# Patient Record
Sex: FEMALE
[De-identification: names, ages, dates, MRNs, and addresses within clinical notes are randomized per-mention and may not be internally consistent; named-entity substitution may affect disease eponyms.]

---

## 2017-04-22 ENCOUNTER — HOSPITAL ENCOUNTER (INPATIENT)
Dept: HOSPITAL 42 - ED | Age: 67
LOS: 3 days | Discharge: HOME | DRG: 74 | End: 2017-04-25
Attending: STUDENT IN AN ORGANIZED HEALTH CARE EDUCATION/TRAINING PROGRAM | Admitting: INTERNAL MEDICINE
Payer: MEDICARE

## 2017-04-22 VITALS — BODY MASS INDEX: 23.6 KG/M2

## 2017-04-22 DIAGNOSIS — E87.6: ICD-10-CM

## 2017-04-22 DIAGNOSIS — Z66: ICD-10-CM

## 2017-04-22 DIAGNOSIS — K21.9: ICD-10-CM

## 2017-04-22 DIAGNOSIS — I10: ICD-10-CM

## 2017-04-22 DIAGNOSIS — E78.5: ICD-10-CM

## 2017-04-22 DIAGNOSIS — E11.65: ICD-10-CM

## 2017-04-22 DIAGNOSIS — Z80.1: ICD-10-CM

## 2017-04-22 DIAGNOSIS — F17.210: ICD-10-CM

## 2017-04-22 DIAGNOSIS — Z95.5: ICD-10-CM

## 2017-04-22 DIAGNOSIS — Z79.4: ICD-10-CM

## 2017-04-22 DIAGNOSIS — F41.9: ICD-10-CM

## 2017-04-22 DIAGNOSIS — M21.379: ICD-10-CM

## 2017-04-22 DIAGNOSIS — E22.2: ICD-10-CM

## 2017-04-22 DIAGNOSIS — I25.10: ICD-10-CM

## 2017-04-22 DIAGNOSIS — E11.40: Primary | ICD-10-CM

## 2017-04-22 DIAGNOSIS — R20.0: ICD-10-CM

## 2017-04-22 DIAGNOSIS — J44.9: ICD-10-CM

## 2017-04-22 LAB
ADD MANUAL DIFF?: NO
ALBUMIN/GLOB SERPL: 1.2 {RATIO} (ref 1.1–1.8)
ALP SERPL-CCNC: 114 U/L (ref 38–133)
ALT SERPL-CCNC: 28 U/L (ref 7–56)
APPEARANCE UR: CLEAR
APTT BLD: 23.4 SECONDS (ref 23.7–30.8)
AST SERPL-CCNC: 30 U/L (ref 15–39)
BASE EXCESS BLDV CALC-SCNC: -0.1 MMOL/L (ref 0–2)
BASE EXCESS BLDV CALC-SCNC: 1.8 MMOL/L (ref 0–2)
BASOPHILS # BLD AUTO: 0.03 K/MM3 (ref 0–2)
BASOPHILS NFR BLD: 0.2 % (ref 0–3)
BILIRUB SERPL-MCNC: 0.8 MG/DL (ref 0.2–1.3)
BILIRUB UR-MCNC: NEGATIVE MG/DL
BUN SERPL-MCNC: 17 MG/DL (ref 7–21)
CALCIUM SERPL-MCNC: 9.2 MG/DL (ref 8.4–10.5)
CHLORIDE SERPL-SCNC: 95 MMOL/L (ref 98–107)
CO2 SERPL-SCNC: 26 MMOL/L (ref 21–33)
COLOR UR: YELLOW
EOSINOPHIL # BLD: 0.2 10*3/UL (ref 0–0.7)
EOSINOPHIL NFR BLD: 1.2 % (ref 1.5–5)
ERYTHROCYTE [DISTWIDTH] IN BLOOD BY AUTOMATED COUNT: 13.9 % (ref 11.5–14.5)
GLOBULIN SER-MCNC: 3 GM/DL
GLUCOSE SERPL-MCNC: 142 MG/DL (ref 70–110)
GLUCOSE UR STRIP-MCNC: NEGATIVE MG/DL
GRANULOCYTES # BLD: 10.91 10*3/UL (ref 1.4–6.5)
GRANULOCYTES NFR BLD: 64.1 % (ref 50–68)
HCT VFR BLD CALC: 37.5 % (ref 36–48)
INR PPP: 0.96 (ref 0.93–1.08)
KETONES UR STRIP-MCNC: NEGATIVE MG/DL
LEUKOCYTE ESTERASE UR-ACNC: NEGATIVE LEU/UL
LYMPHOCYTES # BLD: 4.1 10*3/UL (ref 1.2–3.4)
LYMPHOCYTES NFR BLD AUTO: 23.8 % (ref 22–35)
MCH RBC QN AUTO: 30.8 PG (ref 25–35)
MCHC RBC AUTO-ENTMCNC: 35.7 G/DL (ref 31–37)
MCV RBC AUTO: 86.2 FL (ref 80–105)
MONOCYTES # BLD AUTO: 1.8 10*3/UL (ref 0.1–0.6)
MONOCYTES NFR BLD: 10.7 % (ref 1–6)
PH BLDV: 7.43 [PH] (ref 7.32–7.43)
PH BLDV: 7.45 [PH] (ref 7.32–7.43)
PH UR STRIP: 7.5 [PH] (ref 4.7–8)
PLATELET # BLD: 460 10^3/UL (ref 120–450)
PMV BLD AUTO: 9.4 FL (ref 7–11)
POTASSIUM SERPL-SCNC: 3.2 MMOL/L (ref 3.6–5)
PROT SERPL-MCNC: 6.5 G/DL (ref 5.8–8.3)
PROT UR STRIP-MCNC: NEGATIVE MG/DL
RBC # UR STRIP: NEGATIVE /UL
SODIUM SERPL-SCNC: 130 MMOL/L (ref 132–148)
SP GR UR STRIP: 1.01 (ref 1–1.03)
TROPONIN I SERPL-MCNC: < 0.01 NG/ML
UROBILINOGEN UR STRIP-ACNC: 0.2 E.U./DL
WBC # BLD AUTO: 17 10^3/UL (ref 4.5–11)

## 2017-04-22 PROCEDURE — 3E0F7GC INTRODUCTION OF OTHER THERAPEUTIC SUBSTANCE INTO RESPIRATORY TRACT, VIA NATURAL OR ARTIFICIAL OPENING: ICD-10-PCS | Performed by: STUDENT IN AN ORGANIZED HEALTH CARE EDUCATION/TRAINING PROGRAM

## 2017-04-22 RX ADMIN — INSULIN HUMAN SCH UNITS: 100 INJECTION, SOLUTION PARENTERAL at 22:33

## 2017-04-22 RX ADMIN — INSULIN DETEMIR SCH UNIT: 100 INJECTION, SOLUTION SUBCUTANEOUS at 22:33

## 2017-04-22 RX ADMIN — ALBUTEROL SULFATE PRN MG: 2.5 SOLUTION RESPIRATORY (INHALATION) at 19:53

## 2017-04-22 RX ADMIN — ARFORMOTEROL TARTRATE SCH MCG: 15 SOLUTION RESPIRATORY (INHALATION) at 19:53

## 2017-04-22 RX ADMIN — BUDESONIDE SCH MG: 0.5 SUSPENSION RESPIRATORY (INHALATION) at 19:58

## 2017-04-22 NOTE — CARD
--------------- APPROVED REPORT --------------





EKG Measurement

Heart [id number]

ID [id number]

[id number]

[id number]

[id number]



<Conclusion>

Normal sinus rhythm

Left bundle branch block

Abnormal ECG

## 2017-04-22 NOTE — CP.PCM.HP
<EdenilsonRonnie - Last Filed: 17 15:45>





History of Present Illness





- History of Present Illness


History of Present Illness: 


cc: left foot numbness





HPI: Patient is a 67yo female with past medical history of DM type 2, CAD s/p 

stent placement, COPD, anxiety, tobacco use, HTN and HLD that presents c/o left 

foot numbness for the past 4 days. Patient states that 4 days prior, she began 

experiencing some left foot numbness associated with parasthesias and pain. She 

reported that the numbness was constant with no apparent mitigating or 

worsening factors. Patient stated that the numbness localized only to her foot 

and did not effect her thigh or leg and she became concerned when her pain 

began to worsen and she was unable to walk properly. She reported decreased 

sensation in the left foot and some lightheadedness today. She denied chest pain

, palpitations, SOB, headache, fever, chills, abdominal pain, nausea, vomiting. 





12point ROS as per HPI above, otherwise negative


PMHx: DM type 2, CAD s/p stent placement, COPD, anxiety, tobacco use, HTN, HLD


PSHx: trigger finger release right hand, left ulnar nerve decompression, b/l 

oophorectomy


Family Hx: Mother: Lung Ca, Father: Unknown to her, Sister: Uterine Ca


Social Hx: Current tobacco use (1/2ppd for over 50yrs), denies illicit drugs 

and alcohol use





Present on Admission





- Present on Admission


Any Indicators Present on Admission: No





Past Patient History





- Infectious Disease


Hx of Infectious Diseases: None





- Tetanus Immunizations


Tetanus Immunization: Unknown





- Past Social History


Smoking Status: Current Some Days Smoker





- CARDIAC


Hx Cardiac Disorders: Yes (CAD,PAD, r coronary artery,ANGIOPLASTY)


Hx Hypertension: Yes





- PULMONARY


Hx Respiratory Disorders: Yes


Hx Chronic Obstructive Pulmonary Disease (COPD): Yes





- NEUROLOGICAL


Hx Neurological Disorder: Yes


Hx Dizziness: Yes





- HEENT


Hx HEENT Problems: No





- RENAL


Hx Chronic Kidney Disease: No





- ENDOCRINE/METABOLIC


Hx Endocrine Disorders: Yes


Hx Diabetes Mellitus Type 2: Yes





- HEMATOLOGICAL/ONCOLOGICAL


Hx Blood Disorders: No





- INTEGUMENTARY


Hx Dermatological Problems: Yes


Hx Psoriasis: Yes





- MUSCULOSKELETAL/RHEUMATOLOGICAL


Hx Musculoskeletal Disorders: Yes (LUMBAR RADICULOPATHY,L ULNAR NERVE 

ENTRAPMENT ,DECOMPRESSION)


Hx Back Pain: Yes





- GASTROINTESTINAL


Hx Gastrointestinal Disorders: Yes


Hx Gastroesophageal Reflux: Yes





- GENITOURINARY/GYNECOLOGICAL


Hx Genitourinary Disorders: Yes (1  1 MISCARRIAGE)


Other/Comment: OOPHORECTOMY





- PSYCHIATRIC


Hx Psychophysiologic Disorder: Yes


Hx Anxiety: Yes


Hx Depression: Yes


Hx Emotional Abuse: No


Hx Physical Abuse: No


Hx Substance Use: No





- SURGICAL HISTORY


Hx Cardiac Catheterization: Yes ()


Hx Coronary Stent: Yes (X2)


Other/Comment: trigger finger and ulna sx





- ANESTHESIA


Hx Anesthesia: Yes


Hx Anesthesia Reactions: No


Hx Malignant Hyperthermia: No





Meds


Allergies/Adverse Reactions: 


 Allergies











Allergy/AdvReac Type Severity Reaction Status Date / Time


 


bacitracin Allergy  SWELLING Verified 10/15/16 22:21


 


latex Allergy  RASH Verified 17 11:24


 


Penicillins Allergy  ANAPHYLAXIS Verified 17 11:24














Physical Exam





- Constitutional


Appears: Well, Non-toxic, No Acute Distress





- Head Exam


Head Exam: ATRAUMATIC, NORMAL INSPECTION, NORMOCEPHALIC





- Eye Exam


Eye Exam: EOMI, PERRL.  absent: Scleral icterus





- ENT Exam


ENT Exam: Mucous Membranes Moist





- Neck Exam


Neck exam: Positive for: Normal Inspection.  Negative for: Lymphadenopathy, 

Tenderness, Thyromegaly





- Respiratory Exam


Respiratory Exam: Clear to Auscultation Bilateral.  absent: Rales, Rhonchi, 

Wheezes





- Cardiovascular Exam


Cardiovascular Exam: RRR, +S1, +S2.  absent: Clicks, JVD, Rubs





- GI/Abdominal Exam


GI & Abdominal Exam: Normal Bowel Sounds, Soft.  absent: Distended, Firm, 

Guarding, Rebound, Tenderness





- Extremities Exam


Extremities exam: Positive for: normal inspection, tenderness, pedal pulses 

present.  Negative for: pedal edema


Additional comments: 


left foot tenderness and decreased sensation


5/5 muscle strength in bilateral upper and lower extremities


2/4 dorsalis pedis and posterior tibialis pulses in bilateral lower extremities


right foot sensation intact








- Neurological Exam


Neurological exam: Alert, CN II-XII Intact, Oriented x3


Additional comments: 


alert, awake, oriented x3


cranial nerve 2-12 intact


no facial droop or facial asymmetry


no slurred speech





- Psychiatric Exam


Psychiatric exam: Normal Affect, Normal Mood





- Skin


Skin Exam: Dry, Intact, Normal Color, Warm





Results





- Vital Signs


Recent Vital Signs: 





 Last Vital Signs











Temp  98.2 F   17 11:16


 


Pulse  89   17 13:13


 


Resp  16   17 13:13


 


BP  143/71   17 13:13


 


Pulse Ox  100   17 13:13














- Labs


Result Diagrams: 


 17 12:10





 17 12:10


Labs: 





 Laboratory Results - last 24 hr











  17





  12:10


 


WBC  17.0 H D


 


RBC  4.35


 


Hgb  13.4


 


Hct  37.5


 


MCV  86.2


 


MCH  30.8


 


MCHC  35.7


 


RDW  13.9


 


Plt Count  460 H


 


MPV  9.4


 


Gran %  64.1


 


Lymph % (Auto)  23.8


 


Mono % (Auto)  10.7 H


 


Eos % (Auto)  1.2 L


 


Baso % (Auto)  0.2


 


Gran #  10.91 H


 


Lymph #  4.1 H


 


Mono #  1.8 H


 


Eos #  0.2


 


Baso #  0.03


 


PT  10.4


 


INR  0.96


 


APTT  23.4 L


 


pO2  28 L


 


VBG pH  7.45 H


 


VBG pCO2  37.0 L


 


VBG HCO3  25.7


 


VBG Total CO2  26.8


 


VBG O2 Sat (Calc)  61.2


 


VBG Base Excess  1.8


 


VBG Potassium  3.9


 


Sodium  130 L


 


Chloride  95 L


 


Glucose  152 H


 


Lactate  3.7 H


 


FiO2  21.0


 


Potassium  3.2 L


 


Carbon Dioxide  26


 


Anion Gap  12


 


BUN  17


 


Creatinine  0.6


 


Est GFR ( Amer)  > 60


 


Est GFR (Non-Af Amer)  > 60


 


Random Glucose  142 H


 


Calcium  9.2


 


Total Bilirubin  0.8


 


AST  30


 


ALT  28


 


Alkaline Phosphatase  114


 


Lactate Dehydrogenase  503


 


Total Creatine Kinase  50


 


Troponin I  < 0.01  D


 


Total Protein  6.5


 


Albumin  3.5


 


Globulin  3.0


 


Albumin/Globulin Ratio  1.2


 


Venous Blood Potassium  3.9


 


Urine Color  Yellow


 


Urine Appearance  Clear


 


Urine pH  7.5


 


Ur Specific Gravity  1.010


 


Urine Protein  Negative


 


Urine Glucose (UA)  Negative


 


Urine Ketones  Negative


 


Urine Blood  Negative


 


Urine Nitrate  Negative


 


Urine Bilirubin  Negative


 


Urine Urobilinogen  0.2


 


Ur Leukocyte Esterase  Negative














Assessment & Plan





- Assessment and Plan (Free Text)


Assessment: 


67yo female with history of CAD, COPD, HTN, HLD, tobacco use, anxiety presents c

/o left foot numbness for past 4 days associated with parasthesias, decreased 

sensation and lightheadedness








Plan: 


1. Left foot numbness


-Head CT reviewed; no acute intracranial pathology


-EKG reviewed; normal sinus rhythm with left bundle branch block, no acute ST-

Twave changes; unchanged from prior EKG


-LE doppler pending


-Neurochecks q4h


-Vit B6, B12 pending


-Carotid doppler pending


-Continue ASA, plavix, lipitor, lisinopril


-Neurology consulted - Dr. Cortez





2. COPD


-Continue home meds


-CXR reviewed; no apparent active disease





3. Hypertension


-Continue home meds





4. Hyperlipidemia


-Continue home meds





5. DM type 2


-Levemir 20u HS


-Humalog low dose sliding scale


-consistent carb diet


-Fingersticks ACHS





6. CAD


-Continue ASA, plavix, lipitor, lisinopril





7. DVT/GI Prophylaxis


-Lovenox/Protonix





Patient seen and case discussed with attending, Dr. Gilliam








- Date & Time


Date: 17


Time: 15:37





<Myrna Gilliam MD - Last Filed: 17 16:59>





Results





- Vital Signs


Recent Vital Signs: 





 Last Vital Signs











Temp  98.2 F   17 11:16


 


Pulse  82   17 16:30


 


Resp  16   17 16:30


 


BP  142/81   17 16:30


 


Pulse Ox  98   17 16:30














- Labs


Result Diagrams: 


 17 12:10





 17 12:10





Attending/Attestation





- Attestation


I have personally seen and examined this patient.: Yes


I have fully participated in the care of the patient.: Yes


I have reviewed all pertinent clinical information: Yes


Notes (Text): 





Patient was seen and examined with medical resident .Agreed with resident 

assessment and plan.





 66 yrs  female with past medical history of IRDM, CAD s/p  RCA stent placement 

, COPD, anxiety, tobacco use, HTN and HLD  is admitted with left foot 

numbness and weakness, has sensory deficit in left foot and left lower leg, CT 

head is negative.The etiology of patient symptoms unclear, could be CVA/ L4-5 

rediculopathy, will monitor Neuro check, will check vitamin B 6 and B 12 level, 

we will also get carotid Doppler and will Neurology consult.





Leukocytosis is likely due to recent use of steroid, Chest X ray is negative 

for Pneumonia, UA is negative.Patient does not look to be septic, we will 

monitor WBC count.





Lactic acid on Venous blood gas  is high, etiology unclear, we will repeat 

lactic acid level, if still high, will treat for possible sepsis, we will also 

check lactic acid level.





Management plan was discussed in detail with patient


 Education was provided.

## 2017-04-22 NOTE — ED PDOC
Arrival/HPI





- General


Chief Complaint: Dizziness/Lightheaded


Time Seen by Provider: 17 11:10


Historian: Patient





- History of Present Illness


Narrative History of Present Illness (Text): 


17 11:25





Maryjane Marte is a 66 year old female, whose past medical history includes COPD

, CAD, and cardiac stents, who presents to the emergency department complaining 

of "feeling shakey," associated with "neck discomfort" and shortness of breath 

that has been present for past two weeks. Patient notes that her symptoms began 

when she had a cold with associated coughs, rhinorrhea, congestion, and mild 

fever 2 weeks ago. Patient states her PMD placed her on antibiotics, Levaquin 

for cough with no improvement.  Subsequently was prescribed a cough medication 

which she feels "made my blood sugar really high". Patient states she has 

severe constant pain to left ankle/foot that has worsened over the past several 

days.  Denies trauma. Denies redness or swelling or open lesions. She reports 

that her "left foot drags" "since I was in the hospital last".  She also states 

"I drop things with my hands once in a while".





PMD: Dr. JOVANA Philip











Time/Duration: < month (2 weeks)


Symptom Onset: Gradual


Symptom Course: Worsening


Severity Level: Mild


Activities at Onset: Rest


Context: Home





Past Medical History





- Provider Review


Nursing Documentation Reviewed: Yes





- Infectious Disease


Hx of Infectious Diseases: None





- Tetanus Immunization


Tetanus Immunization: Unknown





- Cardiac


Hx Cardiac Disorders: Yes (CAD,PAD, r coronary artery,ANGIOPLASTY)


Hx Hypertension: Yes





- Pulmonary


Hx Respiratory Disorders: Yes


Hx Chronic Obstructive Pulmonary Disease (COPD): Yes





- Neurological


Hx Neurological Disorder: Yes


Hx Dizziness: Yes





- HEENT


Hx HEENT Disorder: No





- Renal


Hx Renal Disorder: No





- Endocrine/Metabolic


Hx Endocrine Disorders: Yes


Hx Diabetes Mellitus Type 2: Yes





- Hematological/Oncological


Hx Blood Disorders: No





- Integumentary


Hx Dermatological Disorder: Yes


Hx Psoriasis: Yes





- Musculoskeletal/Rheumatological


Hx Musculoskeletal Disorders: Yes (LUMBAR RADICULOPATHY,L ULNAR NERVE 

ENTRAPMENT ,DECOMPRESSION)


Hx Back Pain: Yes





- Gastrointestinal


Hx Gastrointestinal Disorders: Yes


Hx Gastroesophageal Reflux: Yes





- Genitourinary/Gynecological


Hx Genitourinary Disorders: Yes (1  1 MISCARRIAGE)


Other/Comment: OOPHORECTOMY





- Psychiatric


Hx Psychophysiologic Disorder: Yes


Hx Anxiety: Yes


Hx Depression: Yes


Hx Emotional Abuse: No


Hx Physical Abuse: No


Hx Substance Use: No





- Surgical History


Hx Cardiac Catheterization: Yes (2008)


Hx Coronary Stent: Yes (X2)


Other/Comment: trigger finger and ulna sx





- Anesthesia


Hx Anesthesia: Yes


Hx Anesthesia Reactions: No


Hx Malignant Hyperthermia: No





- Suicidal Assessment


Feels Threatened In Home Enviroment: No





Family/Social History





- Physician Review


Nursing Documentation Reviewed: Yes


Family/Social History: No Known Family HX


Smoking Status: Current Some Days Smoker


Hx Alcohol Use: No


Hx Substance Use: No


Hx Substance Use Treatment: No





Allergies/Home Meds


Allergies/Adverse Reactions: 


Allergies





bacitracin Allergy (Verified 10/15/16 22:21)


 SWELLING


latex Allergy (Verified 17 11:24)


 RASH


Penicillins Allergy (Verified 17 11:24)


 ANAPHYLAXIS








Home Medications: 


 Home Meds











 Medication  Instructions  Recorded  Confirmed


 


Atorvastatin Calcium [Lipitor] 20 mg PO DAILY 13


 


QUEtiapine [Seroquel] 100 mg PO DAILY 14


 


Alprazolam [Xanax] 1 mg PO QID 07/25/15 04/22/17


 


Aspirin [Ecotrin] 325 mg PO DAILY 09/26/15 04/22/17


 


Esomeprazole Magnesium [Nexium] 40 mg PO DAILY 16


 


Metoprolol Succinate [Toprol XL] 200 mg PO DAILY 10/06/16 04/22/17


 


Tiotropium [Spiriva] 18 mcg IH DAILY 10/06/16 04/22/17


 


Albuterol Sulfate [Proair Hfa] 1 inh INH PRN PRN 17


 


Fluticasone/Salmeterol 250/50 1 inh INH BID 17





[Advair Diskus 250/50]   


 


Insulin Lispro [humALOG] 0 units XX PRN PRN 17


 


Methylprednisolone 0 mg PO .AS PRESCRIBED 17





[Methylprednisolone]   


 


Mometasone 0.1% [Elocon Cream] 1 appful TOP PRN PRN 17


 


Valsartan [Diovan] 25 mg PO DAILY 17


 


Varenicline Tartrate [Chantix] 0 mg PO BID 17














Review of Systems





- Review of Systems


Constitutional: Fatigue.  absent: Fevers, Night Sweats


Eyes: absent: Vision Changes, Eye Pain


ENT: Rhinorrhea, Sinus Congestion.  absent: Hearing Changes


Respiratory: SOB, Cough


Cardiovascular: Chest Pain, [place].  absent: Edema


Gastrointestinal: Abdominal Pain (cramping)


Genitourinary Female: absent: Urine Output Changes


Musculoskeletal: absent: Back Pain, Neck Pain


Skin: absent: Rash, Pruritis


Neurological: absent: Headache, Dizziness


Endocrine: absent: Diaphoresis


Hemo/Lymphatic: absent: Easy Bleeding


Psychiatric: Anxiety.  absent: Depression





Physical Exam





- Physical Exam


Narrative Physical Exam (Text): 


Head:  Atraumatic.  Normocephalic. 


Eyes:  PERRL.  EOMI.  Conjunctivae are not pale.


ENT:  Mucous membranes are moist and intact.  Oropharynx is clear and 

symmetric. No facial edema or erythema.


Neck:  Supple.  Full ROM.  No JVD.  No lymphadenopathy.


Cardiovascular: Tachycardia.  No murmurs, rubs, or gallops. 


Pulmonary/Chest:  Tachypneic. No evidence of respiratory distress.  Clear to 

auscultation bilaterally.  No wheezing, rales or rhonchi.


Abdominal:  Soft and non-distended.  There is no tenderness.  No rebound, 

guarding, or rigidity.  No organomegaly.  Good bowel sounds. No pulsatile 

masses.


Back:  No CVA tenderness.


Extremities:  Strong palpated DP pulse in Lower Left Extremity. No edema.   No 

cyanosis.  No clubbing.  Full range of motion in all extremities.  No calf 

tenderness. No knee pain. Tender to palpation dorsum of left foot and left heel/

achilles. No ulcers or erythema noted. Strong femoral pulses.


Skin:  Skin is warm and dry.  No petechiae.  No purpura. No cyanosis noted to 

feet.


Neurological:  Alert, awake, and oriented to person, place, time, and 

situation.  Mild foot drop noted to left lower extremity, has weak dorsiflexion 

at ankle. No weakness noted at knee or hip or upper extremities.


Psychiatric:  Good eye contact.  Normal interaction, affect, and behavior.











17 20:42





Vital Signs Reviewed: Yes


Vital Signs











  Temp Pulse Resp BP Pulse Ox


 


 17 16:30   82  16  142/81  98


 


 17 13:13   89  16  143/71  100


 


 17 11:16  98.2 F  108 H  20  155/74 H  100











Temperature: Afebrile


Blood Pressure: Hypertensive


Pulse: Tachycardic


Respiratory Rate: Normal


Appearance: Positive for: Uncomfortable, Other (Anxious)


Pain Distress: Moderate


Mental Status: Positive for: Alert and Oriented X 3





Medical Decision Making


ED Course and Treatment: 


17 11:25





Impression:





66 year old female  complaining of "feeling shakey," shortness of breath, and 

cramps today and left foot pain for four days.





Differential Diagnosis included but are not limited to:  DVT vs. Peripheral 

Vascular Disease vs. Anxiety vs. COPD vs. Pneumonia





Plan:


-- EKG


-- Chest X-ray


-- Bilateral Lower Extremity US


-- Carotid and Vertebral US


-- VBG, Blood Culture


-- Urine Culture


-- Labs


-- K-Dur, Morphine, and Xanax 


-- Reassess and disposition





Prior Visits:


Notes and results from previous visits were reviewed. Patient last seen in the 

ED on 02/10/17 for intermittent chest pressure and shortness of breath for one 

week. 





Progress Notes:


17 20:45


Patient is a 65 yo female who has prior cardiac history, recent stents, 

CONTINUE TO SMOKE, risks of this discussed. She has had persistent cough, sob.


On exam, no respiratory distress noted although leukocytosis and elevated 

lactate noted. On re-exam, not tachycardic or tachypneic, with no clear source 

of infection. Not hypotensive. Exam not consistent with sepsis.


IV fluids given and repeat lactate ordered.


She has significant pain to left foot with no known trauma. THERE IS A STRONG 

AND PALPABLE PULSE in the left lower extremity, this exam is thus NOT 

consistent with a severe acute arterial occlusion. Cannot exclude a component 

of neuropathy or PVD, although ultrasound is NEGATIVE FOR DVT as per 

technician.  Patient is noted to have some improvement, but persistent pain 

after iv morphine. Cannot exclude medication effect or tendinitis as source of 

pain as well.





Neck discomfort she reports is "like when I had my heart attack", although 

current EKG is unchanged from previous and initial troponin unremarkable. She 

denies chest discomfort currently.


Case discussed with PMD, requests admission to hospitalist, case d/w Dr. Gilliam.





Foot drop by history appears to have been present for several weeks. On exam, 

there is no midline back pain or other neuro deficits noted. Deficits appears 

isolated to left foot/ankle. CT head unremarkable. Will continue serial 

neurologic exams.








- Lab Interpretations


Lab Results: 








 17 12:10 





 17 12:10 





 Lab Results





17 12:10: WBC 17.0 H D, RBC 4.35, Hgb 13.4, Hct 37.5, MCV 86.2, MCH 30.8, 

MCHC 35.7, RDW 13.9, Plt Count 460 H, MPV 9.4, Gran % 64.1, Lymph % (Auto) 23.8

, Mono % (Auto) 10.7 H, Eos % (Auto) 1.2 L, Baso % (Auto) 0.2, Gran # 10.91 H, 

Lymph # 4.1 H, Mono # 1.8 H, Eos # 0.2, Baso # 0.03, PT 10.4, INR 0.96, APTT 

23.4 L, pO2 28 L, VBG pH 7.45 H, VBG pCO2 37.0 L, VBG HCO3 25.7, VBG Total CO2 

26.8, VBG O2 Sat (Calc) 61.2, VBG Base Excess 1.8, VBG Potassium 3.9, Glucose 

152 H, Lactate 3.7 H, FiO2 21.0, Sodium 131.0 L, Potassium 3.2 L, Chloride 96.0 

L, Carbon Dioxide 26, Anion Gap 12, BUN 17, Creatinine 0.6, Est GFR (African 

Amer) > 60, Est GFR (Non-Af Amer) > 60, Random Glucose 142 H, Calcium 9.2, 

Total Bilirubin 0.8, AST 30, ALT 28, Alkaline Phosphatase 114, Lactate 

Dehydrogenase 503, Total Creatine Kinase 50, Troponin I < 0.01  D, Total 

Protein 6.5, Albumin 3.5, Globulin 3.0, Albumin/Globulin Ratio 1.2, Venous 

Blood Potassium 3.9, Urine Color Yellow, Urine Appearance Clear, Urine pH 7.5, 

Ur Specific Gravity 1.010, Urine Protein Negative, Urine Glucose (UA) Negative, 

Urine Ketones Negative, Urine Blood Negative, Urine Nitrate Negative, Urine 

Bilirubin Negative, Urine Urobilinogen 0.2, Ur Leukocyte Esterase Negative











- RAD Interpretation


Radiology Orders: 








17 11:39


CHEST ONE VIEW [RAD] Stat 


DUPLEX LOWER EXTRM VEIN BILAT [US] Stat 





17 13:32


HEAD W/O CONTRAST [CT] Stat 











: ED Physician





- EKG Interpretation


EKG Interpretation (Text): 


EKG 13:06 normal sinus rhythm rate of 86 with left bundle branch block





Interpreted by ED Physician: Yes


Type: 12 lead EKG


Comparison: Com.w/previous EKG





- Medication Orders


Current Medication Orders: 








Albuterol Sulfate (Albuterol 0.5% Inhal Sol (2.5 Mg/0.5 Ml) Ud)  2.5 mg IH 

[id number] PRN


   PRN Reason: Shortness of Breath


   Last Admin: 17 19:53  Dose: 2.5 MG





Alprazolam (Xanax)  1 mg PO QID [facility]


   PRN Reason: Protocol


   Stop: 17 18:01


   Last Admin: 17 18:22  Dose: 1 MG





Behavioural


 Document     17 18:22  MANIL  (Rec: 17 18:22  MANIL  [id number])


     Maintenance


      Maintenance Dose                           Yes


     Nonmedicinal


      Nonmedicinal Interventions                 Redirect


     Behavior


      Behavior for Medication:                   Anxiety


Re-Assess: Reassess Psych Meds


 Document     17 19:22  RR  (Rec: 17 20:07  RR  [id number])


      Reassess Psych Med                         Effective





Arformoterol Tartrate (Brovana)  15 mcg IH [id number] [facility]


   Last Admin: 17 19:53  Dose: 15 MCG





Aspirin (Ecotrin)  325 mg PO DAILY [facility]


Atorvastatin Calcium (Lipitor)  20 mg PO DAILY [facility]


Budesonide (Pulmicort Respules)  0.5 mg IH [id number] [facility]


   Last Admin: 17 19:58  Dose: 0.5 MG





Clopidogrel Bisulfate (Plavix)  75 mg PO DAILY [facility]


Enoxaparin Sodium (Lovenox)  40 mg SC DAILY [facility]


   PRN Reason: Protocol


Guaifenesin (Robitussin)  100 mg PO Q4H PRN


   PRN Reason: Cough


Insulin Detemir (Levemir)  20 unit SC HS [facility]


Insulin Human Regular (Humulin R Med)  0 units SC ACHS [facility]


   PRN Reason: Protocol


Losartan Potassium (Cozaar)  25 mg PO DAILY [facility]


Nicotine (Nicoderm Cq)  1 patch TD DAILY [facility]


   Last Admin: 17 18:22  Dose: 1 PATCH





MAR Patch Placement/Removal


 Document     17 18:22  MANIL  (Rec: 17 18:22  MANIL  [id number])


     Patch Removal


      Removal of previous patch done             No


     Patch Placement


      Left, Right or Bilateral                   Right


      Upper or Lower                             Lower


      Pain Location Body Site                    Arm





Pantoprazole Sodium (Protonix Inj)  40 mg IVP DAILY [facility]


Quetiapine Fumarate (Seroquel)  100 mg PO DAILY [facility]


   PRN Reason: Protocol





Discontinued Medications





Albuterol Sulfate (Albuterol 0.5% Inhal Sol (2.5 Mg/0.5 Ml) Ud)  2.5 mg IH 

[id number] PRN


   PRN Reason: Shortness of Breath


Alprazolam (Xanax)  1 mg PO STAT [facility]


   Stop: 17 13:37


   Last Admin: 17 13:45  Dose: 1 MG





Behavioural


 Document     17 13:45  HI  (Rec: 17 13:45  HI  [id number])


     Maintenance


      Maintenance Dose                           No


     Nonmedicinal


      Nonmedicinal Interventions                 Redirect


     Behavior


      Behavior for Medication:                   Anxiety





Sodium Chloride (Sodium Chloride 0.9%)  500 mls @ 1,000 mls/hr IV .Q30M STA


   Stop: 17 12:09


   Last Admin: 17 12:12  Dose: 1,000 MLS/HR





eMAR Start Stop


 Document     17 12:12  HI  (Rec: 17 12:12  HI  [id number])


     Intravenous Solution


      Start Date                                 17


      Start Time                                 12:12





Insulin Human Lispro (Humalog Low)  7 units SC STAT [facility]


   PRN Reason: Protocol


   Stop: 17 19:46


   Last Admin: 17 20:21  Dose: 7 UNITS





Subcutaneous Administrations


 Document     17 20:21  RR  (Rec: 17 20:21  RR  [id number])


     Injection Site


      MAR Injection Site                         Left Deltoid


     Charges for Administration


      # of Subcutaneous Administrations          1





Morphine Sulfate (Morphine)  2 mg IVP STAT STA


   Stop: 17 11:41


   Last Admin: 17 12:11  Dose: 2 MG





MAR Pain Assessment


 Document     17 12:11  HI  (Rec: 17 12:11  HI  [id number])


     Pain Reassessment


      Is this a pain reassessment?               No


     Sleep


      Is patient sleeping during reassessment?   No


     Presence of Pain


      Presence of Pain                           Yes


     Pain Scale Used


      Pain Scale Used                            Numeric


     Location


      Pain Location Body Site                    Foot


IVP Administration


 Document     17 12:11  HI  (Rec: 17 12:11  [id number])


     Charges for Administration


      # of IVP Administrations                   1





Pneumococcal Polyvalent Vaccine (Pneumovax 23 Vaccine)  0.5 ml IM .ONCE ONE


   Stop: 17 18:42


Potassium Chloride (K-Dur 20 Meq Er Tab)  40 meq PO STAT STA


   Stop: 17 13:03


   Last Admin: 17 13:25  Dose: 40 MEQ











- Scribe Statement


The provider has reviewed the documentation as recorded by the Augusto Lang





Provider Scribe Attestation:


All medical record entries made by the Scribe were at my direction and 

personally dictated by me. I have reviewed the chart and agree that the record 

accurately reflects my personal performance of the history, physical exam, 

medical decision making, and the department course for this patient. I have 

also personally directed, reviewed, and agree with the discharge instructions 

and disposition.





Disposition/Present on Arrival





- Present on Arrival


Any Indicators Present on Arrival: Yes


History of DVT/PE: No


History of Uncontrolled Diabetes: Yes


Urinary Catheter: No


History of Decub. Ulcer: No


History Surgical Site Infection Following: None





- Disposition


Have Diagnosis and Disposition been Completed?: Yes


Diagnosis: 


 Chest pain, Leg pain, Leukocytosis, Elevated lactic acid level, COPD (chronic 

obstructive pulmonary disease), Hypokalemia, Acute hyponatremia, Foot drop


Disposition: HOSPITALIZED


Disposition Time: 13:20


Patient Plan: Admission, Telemetry


Patient Problems: 


 Current Active Problems











Problem Status Diagnosed


 


Acute hyponatremia Acute 


 


Anxiety Acute 


 


COPD (chronic obstructive pulmonary disease) Acute 


 


Chest pain Acute 


 


Coronary atherosclerosis of native coronary artery Acute 


 


Dehydration Acute 


 


Elevated lactic acid level Acute 


 


Foot drop Acute 


 


Headache Acute 


 


Hyperglycemia Acute 


 


Hypertension Acute 


 


Hypokalemia Acute 


 


Leg pain Acute 


 


Leukocytosis Acute 


 


Mixed hyperlipidemia Acute 


 


Numbness Acute 


 


Postsurgical percutaneous transluminal coronary angioplasty status Acute 


 


Shortness of breath Acute 


 


Tobacco use disorder Acute 











Condition: FAIR

## 2017-04-22 NOTE — CT
PROCEDURE:  CT HEAD WITHOUT CONTRAST.



HISTORY:

left foot drop, hx of extremity weakness



COMPARISON:

None available. 



TECHNIQUE:

Axial computed tomography images were obtained through the head/brain 

without intravenous contrast.  



Radiation dose:



Total exam DLP = 801 mGy-cm.



This CT exam was performed using one or more of the following dose 

reduction techniques: Automated exposure control, adjustment of the 

mA and/or kV according to patient size, and/or use of iterative 

reconstruction technique.



FINDINGS:



HEMORRHAGE:

No intracranial hemorrhage. 



BRAIN:

No mass effect or edema.  No atrophy or chronic microvascular 

ischemic changes.



VENTRICLES:

Unremarkable. No hydrocephalus. 



CALVARIUM:

Unremarkable.



PARANASAL SINUSES:

Unremarkable as visualized. No significant inflammatory changes.



MASTOID AIR CELLS:

Unremarkable as visualized. No inflammatory changes.



OTHER FINDINGS:

None.



IMPRESSION:

Normal CT of the Head.

## 2017-04-23 LAB
ADD MANUAL DIFF?: NO
ALBUMIN/GLOB SERPL: 1 {RATIO} (ref 1.1–1.8)
ALP SERPL-CCNC: 82 U/L (ref 38–133)
ALT SERPL-CCNC: 34 U/L (ref 7–56)
AST SERPL-CCNC: 18 U/L (ref 15–39)
BASOPHILS # BLD AUTO: 0.04 K/MM3 (ref 0–2)
BASOPHILS NFR BLD: 0.5 % (ref 0–3)
BILIRUB SERPL-MCNC: 0.5 MG/DL (ref 0.2–1.3)
BUN SERPL-MCNC: 11 MG/DL (ref 7–21)
CALCIUM SERPL-MCNC: 8.5 MG/DL (ref 8.4–10.5)
CHLORIDE SERPL-SCNC: 102 MMOL/L (ref 95–110)
CO2 SERPL-SCNC: 29 MMOL/L (ref 21–33)
EOSINOPHIL # BLD: 0.4 10*3/UL (ref 0–0.7)
EOSINOPHIL NFR BLD: 5 % (ref 1.5–5)
ERYTHROCYTE [DISTWIDTH] IN BLOOD BY AUTOMATED COUNT: 14.4 % (ref 11.5–14.5)
GLOBULIN SER-MCNC: 2.7 GM/DL
GLUCOSE SERPL-MCNC: 119 MG/DL (ref 70–110)
GRANULOCYTES # BLD: 3.89 10*3/UL (ref 1.4–6.5)
GRANULOCYTES NFR BLD: 47 % (ref 50–68)
HCT VFR BLD CALC: 36.6 % (ref 36–48)
LYMPHOCYTES # BLD: 3.2 10*3/UL (ref 1.2–3.4)
LYMPHOCYTES NFR BLD AUTO: 38.3 % (ref 22–35)
MCH RBC QN AUTO: 30.2 PG (ref 25–35)
MCHC RBC AUTO-ENTMCNC: 34.4 G/DL (ref 31–37)
MCV RBC AUTO: 87.8 FL (ref 80–105)
MONOCYTES # BLD AUTO: 0.8 10*3/UL (ref 0.1–0.6)
MONOCYTES NFR BLD: 9.2 % (ref 1–6)
PLATELET # BLD: 399 10^3/UL (ref 120–450)
PMV BLD AUTO: 9.1 FL (ref 7–11)
POTASSIUM SERPL-SCNC: 4 MMOL/L (ref 3.6–5)
PROT SERPL-MCNC: 5.6 G/DL (ref 5.8–8.3)
SODIUM SERPL-SCNC: 136 MMOL/L (ref 132–148)
WBC # BLD AUTO: 8.3 10^3/UL (ref 4.5–11)

## 2017-04-23 RX ADMIN — INSULIN HUMAN SCH: 100 INJECTION, SOLUTION PARENTERAL at 08:29

## 2017-04-23 RX ADMIN — ARFORMOTEROL TARTRATE SCH MCG: 15 SOLUTION RESPIRATORY (INHALATION) at 08:28

## 2017-04-23 RX ADMIN — ENOXAPARIN SODIUM SCH MG: 30 INJECTION SUBCUTANEOUS at 10:21

## 2017-04-23 RX ADMIN — BUDESONIDE SCH MG: 0.5 SUSPENSION RESPIRATORY (INHALATION) at 08:28

## 2017-04-23 RX ADMIN — INSULIN HUMAN SCH: 100 INJECTION, SOLUTION PARENTERAL at 22:08

## 2017-04-23 RX ADMIN — GUAIFENESIN PRN MG: 100 SOLUTION ORAL at 08:23

## 2017-04-23 RX ADMIN — GUAIFENESIN PRN MG: 100 SOLUTION ORAL at 19:23

## 2017-04-23 RX ADMIN — BUDESONIDE SCH MG: 0.5 SUSPENSION RESPIRATORY (INHALATION) at 19:37

## 2017-04-23 RX ADMIN — INSULIN DETEMIR SCH UNIT: 100 INJECTION, SOLUTION SUBCUTANEOUS at 22:08

## 2017-04-23 RX ADMIN — ALBUTEROL SULFATE PRN MG: 2.5 SOLUTION RESPIRATORY (INHALATION) at 19:36

## 2017-04-23 RX ADMIN — ASPIRIN SCH MG: 325 TABLET, DELAYED RELEASE ORAL at 10:24

## 2017-04-23 RX ADMIN — INSULIN HUMAN SCH UNITS: 100 INJECTION, SOLUTION PARENTERAL at 16:55

## 2017-04-23 RX ADMIN — ARFORMOTEROL TARTRATE SCH MCG: 15 SOLUTION RESPIRATORY (INHALATION) at 19:37

## 2017-04-23 RX ADMIN — INSULIN HUMAN SCH UNITS: 100 INJECTION, SOLUTION PARENTERAL at 11:26

## 2017-04-23 NOTE — US
HISTORY:

Leg pain and swelling. Evaluate for DVT



PHYSICIAN(S):  Rubens Huertas MD.



TECHNIQUE:

Duplex sonography and color-flow Doppler with graded compression were 

used to evaluate the deep venous systems of both lower extremities. 



FINDINGS:

The visualized deep venous systems of both lower extremities are 

sonographically normal and compressible. Normal wave forms and 

augmentation are seen. There is no sonographic evidence for deep 

venous thrombosis in the visualized segments of both lower 

extremities.



IMPRESSION:

No sonographic evidence for deep venous thrombosis in the visualized 

segments of both lower extremities.

## 2017-04-23 NOTE — CP.PCM.PN
<David Wagner - Last Filed: 04/23/17 14:56>





Subjective





- Date & Time of Evaluation


Date of Evaluation: 04/23/17


Time of Evaluation: 09:30





- Subjective


Subjective: 


Dr. Wagner PGY 1 Hospitalist Note





Patient seen and evaluated at bedside with family present and attending. She 

reports continued foot pain and numbness along the 1st and 2nd toes. She is 

worried about losing her foot due to diabetes. she denies any pain or numbness 

anywhere else. She denies any fever,chills, chest pain, SOB or palpitations. 


No adverse events reported over night.





Objective





- Vital Signs/Intake and Output


Vital Signs (last 24 hours): 


 











Temp Pulse Resp BP Pulse Ox


 


 98.2 F   97 H  20   109/66   98 


 


 04/22/17 18:26  04/23/17 10:20  04/23/17 08:32  04/23/17 10:20  04/23/17 08:32








Intake and Output: 


 











 04/23/17 04/23/17





 06:59 18:59


 


Intake Total 550 720


 


Output Total 500 


 


Balance 50 720














- Medications


Medications: 


 Current Medications





Acetaminophen (Tylenol 325mg Tab)  650 mg PO Q6H PRN


   PRN Reason: Pain, moderate (4-7)


Albuterol Sulfate (Albuterol 0.5% Inhal Sol (2.5 Mg/0.5 Ml) Ud)  2.5 mg IH 

[id number] PRN


   PRN Reason: Shortness of Breath


   Last Admin: 04/22/17 19:53 Dose:  2.5 mg


Alprazolam (Xanax)  1 mg PO QID [facility]


   PRN Reason: Protocol


   Stop: 04/29/17 18:01


   Last Admin: 04/23/17 13:39 Dose:  1 mg


Arformoterol Tartrate (Brovana)  15 mcg IH [id number] [facility]


   Last Admin: 04/23/17 08:28 Dose:  15 mcg


Aspirin (Ecotrin)  325 mg PO DAILY [facility]


   Last Admin: 04/23/17 10:24 Dose:  325 mg


Atorvastatin Calcium (Lipitor)  20 mg PO DAILY [facility]


   Last Admin: 04/23/17 10:20 Dose:  20 mg


Budesonide (Pulmicort Respules)  0.5 mg IH [id number] [facility]


   Last Admin: 04/23/17 08:28 Dose:  0.5 mg


Clopidogrel Bisulfate (Plavix)  75 mg PO DAILY [facility]


   Last Admin: 04/23/17 10:20 Dose:  75 mg


Enoxaparin Sodium (Lovenox)  40 mg SC DAILY [facility]


   PRN Reason: Protocol


   Last Admin: 04/23/17 10:21 Dose:  40 mg


Gabapentin (Neurontin)  100 mg PO TID [facility]


   PRN Reason: Protocol


   Last Admin: 04/23/17 14:45 Dose:  100 mg


Guaifenesin (Robitussin)  100 mg PO Q4H PRN


   PRN Reason: Cough


   Last Admin: 04/23/17 08:23 Dose:  100 mg


Insulin Detemir (Levemir)  20 unit SC HS [facility]


   Last Admin: 04/22/17 22:33 Dose:  20 unit


Insulin Human Regular (Humulin R Med)  0 units SC ACHS [facility]


   PRN Reason: Protocol


   Last Admin: 04/23/17 11:26 Dose:  5 units


Losartan Potassium (Cozaar)  25 mg PO DAILY [facility]


   Last Admin: 04/23/17 10:20 Dose:  25 mg


Nicotine (Nicoderm Cq)  1 patch TD DAILY [facility]


   Last Admin: 04/23/17 10:22 Dose:  1 patch


Pantoprazole Sodium (Protonix Inj)  40 mg IVP DAILY [facility]


   Last Admin: 04/23/17 10:22 Dose:  40 mg


Quetiapine Fumarate (Seroquel)  100 mg PO HS [facility]


   PRN Reason: Protocol











- Labs


Labs: 


 





 04/23/17 07:23 





 04/23/17 07:23 





 











PT  10.4 Seconds (9.9-11.8)   04/22/17  12:10    


 


INR  0.96  (0.93-1.08)   04/22/17  12:10    


 


APTT  23.4 Seconds (23.7-30.8)  L  04/22/17  12:10    














- Constitutional


Appears: Non-toxic, No Acute Distress





- Head Exam


Head Exam: ATRAUMATIC, NORMOCEPHALIC





- Eye Exam


Eye Exam: EOMI, Normal appearance, PERRL


Pupil Exam: NORMAL ACCOMODATION, PERRL





- ENT Exam


ENT Exam: Mucous Membranes Moist.  absent: Normal Oropharynx (poor dentition)





- Respiratory Exam


Respiratory Exam: Clear to Ausculation Bilateral, NORMAL BREATHING PATTERN.  

absent: Rales, Rhonchi, Wheezes





- Cardiovascular Exam


Cardiovascular Exam: REGULAR RHYTHM, +S1, +S2





- GI/Abdominal Exam


GI & Abdominal Exam: Soft, Normal Bowel Sounds.  absent: Tenderness





- Extremities Exam


Extremities Exam: absent: Normal Inspection (numbness along toes), Pedal Edema, 

Tenderness





- Neurological Exam


Neurological Exam: Alert, Awake, CN II-XII Intact, Oriented x3





- Psychiatric Exam


Psychiatric exam: Agitated





- Skin


Skin Exam: Dry, Intact, Normal Color, Warm





Assessment and Plan





- Assessment and Plan (Free Text)


Assessment: 


67yo female with history of CAD, COPD, HTN, HLD, tobacco use, anxiety presents c

/o left foot numbness for past 4 days associated with parasthesias, decreased 

sensation and lightheadedness.





Plan: 


Left foot numbness


* Neurology consulted, help appreciated


* Head CT reviewed; no acute intracranial pathology [see full report]


* EKG reviewed; normal sinus rhythm with left bundle branch block, no acute ST-

Twave changes; unchanged from prior EKG


* LE doppler shoed no evidence of DVT


* Neurochecks q4h


* Vit B12 snl


* Vit B6 pending


* Carotid doppler pending


* Continue ASA, plavix, lipitor, lisinopril


* Started on gabapentin for neuropathy





COPD


* Continue home meds


* CXR reviewed; no apparent active disease





Hypertension


* Continue home meds





Hyperlipidemia


* Continue home meds





DM type 2


* Levemir 20u HS


* Humalog low dose sliding scale


* consistent carb diet


* Fingersticks ACHS





CAD


* Continue ASA, plavix, lipitor, lisinopril





DVT/GI Prophylaxis


* Lovenox/Protonix





Patient seen and case discussed with attending








<Mane SILVER,Myrna - Last Filed: 04/23/17 15:43>





Objective





- Vital Signs/Intake and Output


Vital Signs (last 24 hours): 


 











Temp Pulse Resp BP Pulse Ox


 


 98.2 F   97 H  20   109/66   98 


 


 04/22/17 18:26  04/23/17 10:20  04/23/17 08:32  04/23/17 10:20  04/23/17 08:32








Intake and Output: 


 











 04/23/17 04/23/17





 06:59 18:59


 


Intake Total 550 720


 


Output Total 500 


 


Balance 50 720














- Medications


Medications: 


 Current Medications





Acetaminophen (Tylenol 325mg Tab)  650 mg PO Q6H PRN


   PRN Reason: Pain, moderate (4-7)


   Last Admin: 04/23/17 15:05 Dose:  650 mg


Albuterol Sulfate (Albuterol 0.5% Inhal Sol (2.5 Mg/0.5 Ml) Ud)  2.5 mg IH 

[id number] PRN


   PRN Reason: Shortness of Breath


   Last Admin: 04/22/17 19:53 Dose:  2.5 mg


Alprazolam (Xanax)  1 mg PO QID [facility]


   PRN Reason: Protocol


   Stop: 04/29/17 18:01


   Last Admin: 04/23/17 13:39 Dose:  1 mg


Arformoterol Tartrate (Brovana)  15 mcg IH [id number] [facility]


   Last Admin: 04/23/17 08:28 Dose:  15 mcg


Aspirin (Ecotrin)  325 mg PO DAILY [facility]


   Last Admin: 04/23/17 10:24 Dose:  325 mg


Atorvastatin Calcium (Lipitor)  20 mg PO DAILY [facility]


   Last Admin: 04/23/17 10:20 Dose:  20 mg


Budesonide (Pulmicort Respules)  0.5 mg IH [id number] [facility]


   Last Admin: 04/23/17 08:28 Dose:  0.5 mg


Clopidogrel Bisulfate (Plavix)  75 mg PO DAILY [facility]


   Last Admin: 04/23/17 10:20 Dose:  75 mg


Enoxaparin Sodium (Lovenox)  40 mg SC DAILY [facility]


   PRN Reason: Protocol


   Last Admin: 04/23/17 10:21 Dose:  40 mg


Gabapentin (Neurontin)  100 mg PO TID [facility]


   PRN Reason: Protocol


   Last Admin: 04/23/17 14:45 Dose:  100 mg


Guaifenesin (Robitussin)  100 mg PO Q4H PRN


   PRN Reason: Cough


   Last Admin: 04/23/17 08:23 Dose:  100 mg


Insulin Detemir (Levemir)  20 unit SC HS [facility]


   Last Admin: 04/22/17 22:33 Dose:  20 unit


Insulin Human Regular (Humulin R Med)  0 units SC ACHS [facility]


   PRN Reason: Protocol


   Last Admin: 04/23/17 11:26 Dose:  5 units


Losartan Potassium (Cozaar)  25 mg PO DAILY [facility]


   Last Admin: 04/23/17 10:20 Dose:  25 mg


Nicotine (Nicoderm Cq)  1 patch TD DAILY [facility]


   Last Admin: 04/23/17 10:22 Dose:  1 patch


Pantoprazole Sodium (Protonix Inj)  40 mg IVP DAILY [facility]


   Last Admin: 04/23/17 10:22 Dose:  40 mg


Quetiapine Fumarate (Seroquel)  100 mg PO HS [facility]


   PRN Reason: Protocol











- Labs


Labs: 


 





 04/23/17 07:23 





 04/23/17 07:23 





 











PT  10.4 Seconds (9.9-11.8)   04/22/17  12:10    


 


INR  0.96  (0.93-1.08)   04/22/17  12:10    


 


APTT  23.4 Seconds (23.7-30.8)  L  04/22/17  12:10    














Attending/Attestation





- Attestation


I have personally seen and examined this patient.: Yes


I have fully participated in the care of the patient.: Yes


I have reviewed all pertinent clinical information, including history, physical 

exam and plan: Yes


Notes (Text): 


Patient was seen and examined with medical resident .Agreed with resident 

assessment and plan.





66 yrs female with past medical history of IRDM, CAD s/p  RCA stent placement 02 /17, COPD, anxiety, tobacco use, HTN and HLD  is admitted with left foot 

numbness and weakness, has sensory deficit in left foot and left lower leg, CT 

head is negative. The etiology of patient symptoms unclear, could be CVA/ L4-5 

radiculopathy,


Neurology consult  is pending, we have started  her on Neurontin,we will also 

get  carotid Doppler,


 Physical therapy  evaluation is pending,


Management plan was discussed in detail with patient


 Education was provided.

## 2017-04-23 NOTE — RAD
PROCEDURE:  CHEST RADIOGRAPH, 1 VIEW



HISTORY:

hx of cough and sob



COMPARISON:

None available.



FINDINGS:



LUNGS:

Clear.



PLEURA:

No pneumothorax or pleural fluid seen.



CARDIOVASCULAR:

Normal.



OSSEOUS STRUCTURES:

No significant abnormalities.



VISUALIZED UPPER ABDOMEN:

Normal.



OTHER FINDINGS:

None. 



IMPRESSION:

No active disease.

## 2017-04-24 LAB
ADD MANUAL DIFF?: NO
ALBUMIN/GLOB SERPL: 1 {RATIO} (ref 1.1–1.8)
ALP SERPL-CCNC: 95 U/L (ref 38–133)
ALT SERPL-CCNC: 37 U/L (ref 7–56)
AST SERPL-CCNC: 27 U/L (ref 15–39)
BASOPHILS # BLD AUTO: 0.03 K/MM3 (ref 0–2)
BASOPHILS NFR BLD: 0.3 % (ref 0–3)
BILIRUB SERPL-MCNC: 0.4 MG/DL (ref 0.2–1.3)
BUN SERPL-MCNC: 13 MG/DL (ref 7–21)
CALCIUM SERPL-MCNC: 8.3 MG/DL (ref 8.4–10.5)
CHLORIDE SERPL-SCNC: 98 MMOL/L (ref 95–110)
CO2 SERPL-SCNC: 27 MMOL/L (ref 21–33)
EOSINOPHIL # BLD: 0.5 10*3/UL (ref 0–0.7)
EOSINOPHIL NFR BLD: 5 % (ref 1.5–5)
ERYTHROCYTE [DISTWIDTH] IN BLOOD BY AUTOMATED COUNT: 14.1 % (ref 11.5–14.5)
GLOBULIN SER-MCNC: 2.7 GM/DL
GLUCOSE SERPL-MCNC: 206 MG/DL (ref 70–110)
GRANULOCYTES # BLD: 4.82 10*3/UL (ref 1.4–6.5)
GRANULOCYTES NFR BLD: 52.7 % (ref 50–68)
HCT VFR BLD CALC: 35.6 % (ref 36–48)
LYMPHOCYTES # BLD: 3.1 10*3/UL (ref 1.2–3.4)
LYMPHOCYTES NFR BLD AUTO: 33.6 % (ref 22–35)
MCH RBC QN AUTO: 29.9 PG (ref 25–35)
MCHC RBC AUTO-ENTMCNC: 34 G/DL (ref 31–37)
MCV RBC AUTO: 87.9 FL (ref 80–105)
MONOCYTES # BLD AUTO: 0.8 10*3/UL (ref 0.1–0.6)
MONOCYTES NFR BLD: 8.4 % (ref 1–6)
PLATELET # BLD: 382 10^3/UL (ref 120–450)
PMV BLD AUTO: 9 FL (ref 7–11)
POTASSIUM SERPL-SCNC: 4.6 MMOL/L (ref 3.6–5)
PROT SERPL-MCNC: 5.5 G/DL (ref 5.8–8.3)
SODIUM SERPL-SCNC: 131 MMOL/L (ref 132–148)
WBC # BLD AUTO: 9.2 10^3/UL (ref 4.5–11)

## 2017-04-24 RX ADMIN — GUAIFENESIN PRN MG: 100 SOLUTION ORAL at 22:42

## 2017-04-24 RX ADMIN — BUDESONIDE SCH MG: 0.5 SUSPENSION RESPIRATORY (INHALATION) at 20:50

## 2017-04-24 RX ADMIN — INSULIN HUMAN SCH UNITS: 100 INJECTION, SOLUTION PARENTERAL at 12:37

## 2017-04-24 RX ADMIN — INSULIN DETEMIR SCH UNIT: 100 INJECTION, SOLUTION SUBCUTANEOUS at 21:59

## 2017-04-24 RX ADMIN — ASPIRIN SCH MG: 325 TABLET, DELAYED RELEASE ORAL at 10:10

## 2017-04-24 RX ADMIN — BUDESONIDE SCH MG: 0.5 SUSPENSION RESPIRATORY (INHALATION) at 07:52

## 2017-04-24 RX ADMIN — INSULIN HUMAN SCH: 100 INJECTION, SOLUTION PARENTERAL at 22:40

## 2017-04-24 RX ADMIN — PANTOPRAZOLE SODIUM SCH MG: 40 TABLET, DELAYED RELEASE ORAL at 08:09

## 2017-04-24 RX ADMIN — ARFORMOTEROL TARTRATE SCH MCG: 15 SOLUTION RESPIRATORY (INHALATION) at 07:52

## 2017-04-24 RX ADMIN — ENOXAPARIN SODIUM SCH MG: 30 INJECTION SUBCUTANEOUS at 10:11

## 2017-04-24 RX ADMIN — INSULIN HUMAN SCH UNITS: 100 INJECTION, SOLUTION PARENTERAL at 08:10

## 2017-04-24 RX ADMIN — INSULIN HUMAN SCH: 100 INJECTION, SOLUTION PARENTERAL at 17:00

## 2017-04-24 RX ADMIN — ARFORMOTEROL TARTRATE SCH MCG: 15 SOLUTION RESPIRATORY (INHALATION) at 20:50

## 2017-04-24 RX ADMIN — GUAIFENESIN PRN MG: 100 SOLUTION ORAL at 08:09

## 2017-04-24 NOTE — CP.PCM.PN
<David Wagner - Last Filed: 04/24/17 13:43>





Subjective





- Date & Time of Evaluation


Date of Evaluation: 04/24/17


Time of Evaluation: 09:30





- Subjective


Subjective: 


Dr. Wagner PGY 1 Hospitalist Note





Patient seen and evaluated at bedside. She states she has been tired and was 

sleepy all day yesterday. She also notes her left foot numbness is still 

present but no pain noted. She has been able to walk to the bathroom without 

complaints. She denies any nausea, vomiting, chest pain, SOB, fever, or chills. 

She voices that she would like to see her primary care physician. 





Objective





- Vital Signs/Intake and Output


Vital Signs (last 24 hours): 


 











Temp Pulse Resp BP Pulse Ox


 


 97.9 F   84   17   90/52 L  98 


 


 04/24/17 06:00  04/24/17 10:12  04/24/17 06:00  04/24/17 10:12  04/24/17 06:00








Intake and Output: 


 











 04/24/17 04/24/17





 06:59 18:59


 


Intake Total 1580 480


 


Balance 1580 480














- Medications


Medications: 


 Current Medications





Acetaminophen (Tylenol 325mg Tab)  650 mg PO Q6H PRN


   PRN Reason: Pain, moderate (4-7)


   Last Admin: 04/24/17 12:36 Dose:  650 mg


Albuterol Sulfate (Albuterol 0.5% Inhal Sol (2.5 Mg/0.5 Ml) Ud)  2.5 mg IH 

[id number] PRN


   PRN Reason: Shortness of Breath


   Last Admin: 04/23/17 19:36 Dose:  2.5 mg


Alprazolam (Xanax)  1 mg PO QID [facility]


   PRN Reason: Protocol


   Stop: 04/29/17 18:01


   Last Admin: 04/24/17 10:07 Dose:  1 mg


Arformoterol Tartrate (Brovana)  15 mcg IH [id number] [facility]


   Last Admin: 04/24/17 07:52 Dose:  15 mcg


Aspirin (Ecotrin)  325 mg PO DAILY [facility]


   Last Admin: 04/24/17 10:10 Dose:  325 mg


Atorvastatin Calcium (Lipitor)  20 mg PO DAILY [facility]


   Last Admin: 04/24/17 10:11 Dose:  20 mg


Budesonide (Pulmicort Respules)  0.5 mg IH [id number] [facility]


   Last Admin: 04/24/17 07:52 Dose:  0.5 mg


Clopidogrel Bisulfate (Plavix)  75 mg PO DAILY [facility]


   Last Admin: 04/24/17 10:07 Dose:  75 mg


Enoxaparin Sodium (Lovenox)  40 mg SC DAILY [facility]


   PRN Reason: Protocol


   Last Admin: 04/24/17 10:11 Dose:  40 mg


Gabapentin (Neurontin)  100 mg PO TID [facility]


   PRN Reason: Protocol


   Last Admin: 04/24/17 10:07 Dose:  100 mg


Guaifenesin (Robitussin)  100 mg PO Q4H PRN


   PRN Reason: Cough


   Last Admin: 04/24/17 08:09 Dose:  100 mg


Sodium Chloride (Sodium Chloride 0.9%)  1,000 mls @ 100 mls/hr IV .Q10H [facility]


   Stop: 04/24/17 20:45


Insulin Detemir (Levemir)  20 unit SC [facility]


   Last Admin: 04/23/17 22:08 Dose:  20 unit


Insulin Human Regular (Humulin R Med)  0 units SC ACHS [facility]


   PRN Reason: Protocol


   Last Admin: 04/24/17 12:37 Dose:  5 units


Losartan Potassium (Cozaar)  25 mg PO DAILY [facility]


   Last Admin: 04/24/17 10:12 Dose:  Not Given


Nicotine (Nicoderm Cq)  1 patch TD DAILY [facility]


   Last Admin: 04/24/17 10:14 Dose:  1 patch


Pantoprazole Sodium (Protonix Ec Tab)  40 mg PO ACB [facility]


   Last Admin: 04/24/17 08:09 Dose:  40 mg


Quetiapine Fumarate (Seroquel)  100 mg PO [facility]


   PRN Reason: Protocol











- Labs


Labs: 


 





 04/24/17 07:30 





 04/24/17 07:30 





 











PT  10.4 Seconds (9.9-11.8)   04/22/17  12:10    


 


INR  0.96  (0.93-1.08)   04/22/17  12:10    


 


APTT  23.4 Seconds (23.7-30.8)  L  04/22/17  12:10    














- Constitutional


Appears: Non-toxic, No Acute Distress





- Head Exam


Head Exam: ATRAUMATIC, NORMOCEPHALIC





- Eye Exam


Eye Exam: EOMI, Normal appearance, PERRL


Pupil Exam: NORMAL ACCOMODATION, PERRL





- ENT Exam


ENT Exam: Mucous Membranes Moist.  absent: Normal Oropharynx (poor dentition)





- Respiratory Exam


Respiratory Exam: NORMAL BREATHING PATTERN.  absent: Rales, Rhonchi, Wheezes





- Cardiovascular Exam


Cardiovascular Exam: REGULAR RHYTHM, +S1, +S2.  absent: Gallop, Rubs, Murmur





- GI/Abdominal Exam


GI & Abdominal Exam: Soft, Normal Bowel Sounds.  absent: Tenderness





- Extremities Exam


Extremities Exam: Tenderness (dorsum of left foot between great toe and second 

to).  absent: Normal Inspection (mild tremor), Pedal Edema





- Back Exam


Back Exam: NORMAL INSPECTION.  absent: muscle spasm, rash noted, tenderness





- Neurological Exam


Neurological Exam: Alert, Awake, CN II-XII Intact, Oriented x3





- Psychiatric Exam


Psychiatric exam: Normal Affect, Normal Mood





- Skin


Skin Exam: Dry, Intact, Normal Color, Warm





Assessment and Plan





- Assessment and Plan (Free Text)


Assessment: 


67yo female with history of CAD, COPD, HTN, HLD, tobacco use, anxiety presents c

/o left foot numbness for past 4 days associated with parasthesias, decreased 

sensation and lightheadedness.


Plan: 


Left foot numbness


* Neurology consulted, help appreciated


* Head CT reviewed; no acute intracranial pathology [see full report]


* EKG reviewed; normal sinus rhythm with left bundle branch block, no acute ST-

Twave changes; unchanged from prior EKG


* LE doppler showed no evidence of DVT


* Neurochecks q4h


* Vit B12 snl


* Vit B6 pending


* Carotid doppler pending final report


* Foot x-ray negative for fractures


* Continue ASA, plavix, lipitor, lisinopril


* Continue gabapentin for neuropathy





COPD


* Continue home meds


* CXR reviewed; no apparent active disease





Hypertension


* Patient's BP low this morning 87/64 and 90/52


* Hold Cozaar and start IVF hydration


* may be reason for dizziness





Hyperlipidemia


* Continue home meds





DM type 2


* Levemir 20u HS


* Humalog low dose sliding scale


* consistent carb diet


* Fingersticks ACHS





CAD


* Continue ASA, plavix, lipitor, lisinopril





DVT/GI Prophylaxis


* Lovenox/Protonix





Patient seen and case discussed with attending








<Mayte Silverio - Last Filed: 04/24/17 14:22>





Objective





- Vital Signs/Intake and Output


Vital Signs (last 24 hours): 


 











Temp Pulse Resp BP Pulse Ox


 


 97.9 F   84   17   90/52 L  98 


 


 04/24/17 06:00  04/24/17 10:12  04/24/17 06:00  04/24/17 10:12  04/24/17 06:00








Intake and Output: 


 











 04/24/17 04/24/17





 06:59 18:59


 


Intake Total 1580 480


 


Balance 1580 480














- Medications


Medications: 


 Current Medications





Acetaminophen (Tylenol 325mg Tab)  650 mg PO Q6H PRN


   PRN Reason: Pain, moderate (4-7)


   Last Admin: 04/24/17 12:36 Dose:  650 mg


Albuterol Sulfate (Albuterol 0.5% Inhal Sol (2.5 Mg/0.5 Ml) Ud)  2.5 mg IH 

[id number] PRN


   PRN Reason: Shortness of Breath


   Last Admin: 04/23/17 19:36 Dose:  2.5 mg


Alprazolam (Xanax)  1 mg PO QID [facility]


   PRN Reason: Protocol


   Stop: 04/29/17 18:01


   Last Admin: 04/24/17 10:07 Dose:  1 mg


Arformoterol Tartrate (Brovana)  15 mcg IH [id number] [facility]


   Last Admin: 04/24/17 07:52 Dose:  15 mcg


Aspirin (Ecotrin)  325 mg PO DAILY [facility]


   Last Admin: 04/24/17 10:10 Dose:  325 mg


Atorvastatin Calcium (Lipitor)  20 mg PO DAILY [facility]


   Last Admin: 04/24/17 10:11 Dose:  20 mg


Budesonide (Pulmicort Respules)  0.5 mg IH [id number] [facility]


   Last Admin: 04/24/17 07:52 Dose:  0.5 mg


Clopidogrel Bisulfate (Plavix)  75 mg PO DAILY [facility]


   Last Admin: 04/24/17 10:07 Dose:  75 mg


Enoxaparin Sodium (Lovenox)  40 mg SC DAILY [facility]


   PRN Reason: Protocol


   Last Admin: 04/24/17 10:11 Dose:  40 mg


Gabapentin (Neurontin)  100 mg PO TID [facility]


   PRN Reason: Protocol


   Last Admin: 04/24/17 10:07 Dose:  100 mg


Guaifenesin (Robitussin)  100 mg PO Q4H PRN


   PRN Reason: Cough


   Last Admin: 04/24/17 08:09 Dose:  100 mg


Sodium Chloride (Sodium Chloride 0.9%)  1,000 mls @ 100 mls/hr IV .Q10H [facility]


   Stop: 04/24/17 20:45


Insulin Detemir (Levemir)  20 unit SC HS [facility]


   Last Admin: 04/23/17 22:08 Dose:  20 unit


Insulin Human Regular (Humulin R Med)  0 units SC ACHS [facility]


   PRN Reason: Protocol


   Last Admin: 04/24/17 12:37 Dose:  5 units


Losartan Potassium (Cozaar)  25 mg PO DAILY [facility]


   Last Admin: 04/24/17 10:12 Dose:  Not Given


Nicotine (Nicoderm Cq)  1 patch TD DAILY [facility]


   Last Admin: 04/24/17 10:14 Dose:  1 patch


Pantoprazole Sodium (Protonix Ec Tab)  40 mg PO ACB [facility]


   Last Admin: 04/24/17 08:09 Dose:  40 mg


Quetiapine Fumarate (Seroquel)  100 mg PO HS [facility]


   PRN Reason: Protocol











- Labs


Labs: 


 





 04/24/17 07:30 





 04/24/17 07:30 





 











PT  10.4 Seconds (9.9-11.8)   04/22/17  12:10    


 


INR  0.96  (0.93-1.08)   04/22/17  12:10    


 


APTT  23.4 Seconds (23.7-30.8)  L  04/22/17  12:10    














Attending/Attestation





- Attestation


I have personally seen and examined this patient.: Yes


I have fully participated in the care of the patient.: Yes


I have reviewed all pertinent clinical information, including history, physical 

exam and plan: Yes


Notes (Text): 


04/24/17 14:15


66 year old female with past medical history of diabetes, CAD, COPD, 

hypertension and anxiety who presented with dizziness, left foot numbness and 

weakness.  CT head is negative.  Foot xray was negative.  Carotid dopplers are 

pending.  Neurology consultation and PT evaluation were requested.  Patient is 

on aspirin and statin.  She is on gabapentin.





This morning she is borderline hypotensive at 90/52.  Will hold cozaar and 

start iv fluids.





Today she is requesting to be seen by her pmd.  I did discuss with Dr. JOVANA Aguilar who will assume care starting tomorrow.





Mayte Silverio MD


Hospitalist.

## 2017-04-24 NOTE — US
PROCEDURE:  Bilateral carotid artery duplex ultrasound 



HISTORY:

Carotid stenosis 



PHYSICIAN(S):  Rubens Huertas MD.



TECHNIQUE:

Duplex sonography and color-flow Doppler were used to evaluate the 

carotid bifurcations and limited segments of the vertebral arteries 

bilaterally.



FINDINGS:

There is moderate diffuse heterogeneous plaque noted bilaterally. The 

peak systolic velocity in the proximal right internal carotid artery 

is 127 cm/sec. This corresponds to a 40-59 percent proximal right ICA 

stenosis. Normal systolic velocities are noted in the proximal right 

external carotid artery. There is antegrade flow in the right 

vertebral artery.



The peak systolic velocity in the proximal left internal carotid 

artery is 100 cm/sec. This corresponds to a 20 to 39% proximal left 

ICA stenosis. Normal systolic velocities are noted in the proximal 

left external carotid artery. There is antegrade flow in the left 

vertebral artery.



IMPRESSION:

1. 40-59 percent proximal right ICA stenosis. 



2. 20-39 percent proximal left ICA stenosis.



3. Antegrade flow in both vertebral arteries.

## 2017-04-24 NOTE — RAD
PROCEDURE:  Left Foot Radiographs.



HISTORY:

left foot pain  



COMPARISON:

None.



FINDINGS:



BONES:

Normal. No fracture. 



JOINTS:

Normal. 



SOFT TISSUES:

Normal. 



OTHER FINDINGS:

None.



IMPRESSION:

Normal left foot radiographs.

## 2017-04-25 VITALS
RESPIRATION RATE: 20 BRPM | OXYGEN SATURATION: 97 % | SYSTOLIC BLOOD PRESSURE: 89 MMHG | HEART RATE: 86 BPM | DIASTOLIC BLOOD PRESSURE: 63 MMHG | TEMPERATURE: 98.3 F

## 2017-04-25 LAB
ADD MANUAL DIFF?: NO
ALBUMIN/GLOB SERPL: 1.1 {RATIO} (ref 1.1–1.8)
ALP SERPL-CCNC: 119 U/L (ref 38–133)
ALT SERPL-CCNC: 41 U/L (ref 7–56)
AST SERPL-CCNC: 32 U/L (ref 15–39)
BASOPHILS # BLD AUTO: 0.04 K/MM3 (ref 0–2)
BASOPHILS NFR BLD: 0.5 % (ref 0–3)
BILIRUB SERPL-MCNC: 0.5 MG/DL (ref 0.2–1.3)
BUN SERPL-MCNC: 10 MG/DL (ref 7–21)
CALCIUM SERPL-MCNC: 8.7 MG/DL (ref 8.4–10.5)
CHLORIDE SERPL-SCNC: 94 MMOL/L (ref 98–107)
CO2 SERPL-SCNC: 27 MMOL/L (ref 21–33)
EOSINOPHIL # BLD: 0.5 10*3/UL (ref 0–0.7)
EOSINOPHIL NFR BLD: 5.7 % (ref 1.5–5)
ERYTHROCYTE [DISTWIDTH] IN BLOOD BY AUTOMATED COUNT: 13.9 % (ref 11.5–14.5)
GLOBULIN SER-MCNC: 2.8 GM/DL
GLUCOSE SERPL-MCNC: 229 MG/DL (ref 70–110)
GRANULOCYTES # BLD: 4.35 10*3/UL (ref 1.4–6.5)
GRANULOCYTES NFR BLD: 52.5 % (ref 50–68)
HCT VFR BLD CALC: 35.1 % (ref 36–48)
LYMPHOCYTES # BLD: 2.6 10*3/UL (ref 1.2–3.4)
LYMPHOCYTES NFR BLD AUTO: 30.9 % (ref 22–35)
MCH RBC QN AUTO: 30.2 PG (ref 25–35)
MCHC RBC AUTO-ENTMCNC: 34.8 G/DL (ref 31–37)
MCV RBC AUTO: 86.9 FL (ref 80–105)
MONOCYTES # BLD AUTO: 0.9 10*3/UL (ref 0.1–0.6)
MONOCYTES NFR BLD: 10.4 % (ref 1–6)
PLATELET # BLD: 380 10^3/UL (ref 120–450)
PMV BLD AUTO: 9.2 FL (ref 7–11)
POTASSIUM SERPL-SCNC: 4 MMOL/L (ref 3.6–5)
PROT SERPL-MCNC: 5.8 G/DL (ref 5.8–8.3)
SODIUM SERPL-SCNC: 125 MMOL/L (ref 132–148)
WBC # BLD AUTO: 8.3 10^3/UL (ref 4.5–11)

## 2017-04-25 RX ADMIN — INSULIN HUMAN SCH: 100 INJECTION, SOLUTION PARENTERAL at 17:48

## 2017-04-25 RX ADMIN — BUDESONIDE SCH MG: 0.5 SUSPENSION RESPIRATORY (INHALATION) at 08:06

## 2017-04-25 RX ADMIN — INSULIN HUMAN SCH UNITS: 100 INJECTION, SOLUTION PARENTERAL at 08:25

## 2017-04-25 RX ADMIN — ARFORMOTEROL TARTRATE SCH MCG: 15 SOLUTION RESPIRATORY (INHALATION) at 08:06

## 2017-04-25 RX ADMIN — PANTOPRAZOLE SODIUM SCH: 40 TABLET, DELAYED RELEASE ORAL at 08:26

## 2017-04-25 RX ADMIN — GUAIFENESIN PRN MG: 100 SOLUTION ORAL at 10:37

## 2017-04-25 RX ADMIN — ENOXAPARIN SODIUM SCH MG: 30 INJECTION SUBCUTANEOUS at 10:27

## 2017-04-25 RX ADMIN — INSULIN HUMAN SCH UNITS: 100 INJECTION, SOLUTION PARENTERAL at 12:10

## 2017-04-25 NOTE — PN
DATE: 04/25/2017



SUBJECTIVE:  The patient is seen and examined at bedside on the telemetry gee.
  No acute events overnight.  She remains afebrile and hemodynamically stable.  
This morning, the patient reports some improvement in her left foot 
paresthesias and reports resolution of her reported foot drop.  Otherwise she 
feels well overall and denies any specific complaints.



OBJECTIVE:

VITAL SIGNS:  Temperature 98, pulse 78, blood pressure 148/82, respiratory rate 
20, oxygen saturation 99% on room air.

GENERAL:  No apparent distress.

HEENT:  PERRL.  EOMI.  No scleral icterus.  No conjunctival pallor.

NECK:  No JVD, no bruits.

LUNGS:  Clear to auscultation.

CARDIOVASCULAR:  Regular rate and rhythm.  Normal S1 and S2.

ABDOMEN:  Normoactive bowel sounds, soft, nontender, nondistended.

EXTREMITIES:  No edema.

NEUROLOGIC:  Awake, alert, and oriented x 3.  No focal motor deficits.  
Sensation is intact.



LABORATORY DATA:  WBC 8.3 with 52% neutrophils, hemoglobin 12, hematocrit 35, 
platelets 380.  Sodium 125, potassium 4, chloride 94, bicarb 27, BUN 10, 
creatinine 0.7, glucose 229.



IMAGING STUDIES:  X-ray of left foot demonstrates no acute pathology.



ASSESSMENT:  The patient is a 66-year-old woman with multiple medical 
comorbidities including chronic obstructive pulmonary disease with active 
extensive smoking history, coronary artery disease, status post percutaneous 
coronary intervention with stent placement x 2, hypertension, poorly-controlled 
insulin-dependent diabetes mellitus with diabetic neuropathy, hyperlipidemia, 
anxiety disorder, and syndrome of inappropriate antidiuretic hormone secretion 
who was recently treated on an outpatient basis for acute bronchitis with a 7-
day course of prednisone and Levaquin who presented to [facility] 
Emergency Department with complaint of left calf pain and paresthesias to the 
dorsum of the left foot.



PLAN:

1.  Left foot pain.  Consider etiology secondary to Achilles tendinopathy given 
the patient's recent use of quinolones and steroids for acute bronchitis.  
Differential diagnosis also includes possible diabetic neuropathy.  The patient 
reports slight improvement in her symptoms since initiation of Neurontin.

2.  Insulin-dependent diabetes mellitus, poorly controlled.  Fingersticks range 
from 130-250.  We will increase Levemir to 25 units subcutaneously at bedtime.  
Continue with medium dose insulin sliding scale for coverage, continue to 
monitor fingersticks q. a.c. and at bedtime.  Continue with Neurontin 100 mg 
p.o. t.i.d. for neuropathy.

3.  Coronary artery disease, status post percutaneous coronary intervention 
with stent placement.  Continue with aspirin 81 mg p.o. daily, Plavix 75 mg 
p.o. daily, and Lipitor 20 mg p.o. daily.  Continue with Toprol  mg p.o. 
daily and losartan 25 mg p.o. daily.

4.  COPD.  Continue with bronchodilators and supplemental oxygen as needed.

5.  Hypertension.  Blood pressure is slightly elevated.  The patient has been 
restarted on Toprol  mg p.o. daily.  Continue with losartan 25 mg p.o. 
daily.  We will continue to monitor hemodynamically and adjust 
antihypertensives as needed.

6.  Hyperlipidemia.  Continue with Lipitor 20 mg p.o. daily.

7.  Anxiety disorder.  Continue with Xanax 1 mg p.o. q.i.d. and Seroquel 100 mg 
p.o. at bedtime.

8.  Tobacco dependence.  Continue with nicotine 14 mg transdermal patch. 

9.  SIADH.  We will restart Tolvaptan 15 mg p.o. daily.

9.  Prophylaxis.  Continue with Protonix 40 mg p.o. daily for GI prophylaxis 
and Lovenox 40 mg subcutaneously daily for DVT prophylaxis.



CODE STATUS:  DNR.





__________________________________________

Christopher Philip MD







cc:   



DD: 04/25/2017 08:41:57  [id number]

TT: 04/25/2017 09:15:51

Confirmation # [id number]

Dictation # [id number]

irineo



04/25/2017 08:25:13

RENATA

## 2017-04-26 LAB — VIT B6 SERPL-MCNC: 4.2 NG/ML (ref 2.1–21.7)

## 2017-04-26 NOTE — DS
ADMITTING DIAGNOSIS:  Left foot pain.



DISCHARGE DIAGNOSIS:  Left foot pain secondary to Achilles tendinopathy.



SECONDARY DIAGNOSES:  Insulin-dependent diabetes mellitus (poorly controlled) with diabetic neuropath
y, coronary artery disease status post percutaneous coronary intervention with stent placement, chron
ic obstructive pulmonary disease, hypertension, hyperlipidemia, anxiety disorder, tobacco dependence,
 syndrome of inappropriate antidiuretic hormone secretion.



CONSULTATIONS:  Dr. Cortez (neurology).



IMAGING STUDIES:

1.  Chest x-ray, which demonstrated no active disease.

2.  CT of the head without contrast, which demonstrated no acute findings.

3.  Venous ultrasound of the bilateral lower extremities, which demonstrated no evidence of DVT.

4.  Carotid artery ultrasound, which demonstrated 40%-59% proximal right ICA stenosis, and 20%-39% pr
oximal left ICA stenosis.

5.  X-ray of the left foot, which demonstrated no acute pathology.



HISTORY OF PRESENT ILLNESS:  The patient is a 66-year-old woman with multiple medical comorbidities i
ncluding CAD status post PCI with stent placement, COPD with active tobacco use, hypertension, hyperl
ipidemia, and poorly controlled insulin-dependent diabetes mellitus with diabetic neuropathy, who pre
sents to [facility] with a 4-day history of left foot pain and paresthesias.  



The patient was recently on a course of Levaquin and prednisone for treatment of an acute bronchitis.
  She reports onset of her symptoms shortly after initiating her Levaquin and prednisone.  The patien
t reported significant posterior calf pain with subsequent development of paresthesias to the dorsum 
of her left foot.  Given the progression of her symptoms, she opted for evaluation in the Emergency D
epartment.  



Upon arrival to the Emergency Department, she was noted to be afebrile and hemodynamically stable, an
d initial workup was unremarkable.  Given her ongoing symptoms, the patient was admitted to the telem
etry gee for continued neurological evaluation and physical therapy evaluation.



HOSPITAL COURSE:  Upon admission to the telemetry gee, the patient was evaluated by Dr. Cortez of ne
urology and was found to have a perfectly normal neurological examination.  The patient reported impr
ovement in her symptoms during her hospital stay.  She was told that the etiology of her pain was lik
ely secondary to diabetic neuropathy given that she had improved with initiation of gabapentin, as we
ll as likely Achilles tendinopathy given her recent quinolone use.  During her hospital stay, the pat
ient was encouraged to ambulate with physical therapy, which she did so without any difficulties, and
 by hospital day #3, she was deemed stable for discharge to home.



CONDITION:  Good, improved.



DISPOSITION:  To home.



DISCHARGE MEDICATIONS:  Aspirin 81 mg p.o. daily, Plavix 75 mg p.o. daily, Lipitor 40 mg p.o. daily, 
Toprol  mg p.o. daily, losartan 100 mg p.o. daily, Advair 250/50 mcg 1 puff q. 12 hours, Ventol
in HFA 2 puffs q. 4-6 hours p.r.n. dyspnea/wheeze, Seroquel 50 mg p.o. at bedtime, Levemir 25 units s
ubcutaneously at bedtime, tolvaptan 15 mg p.o. daily, Xanax 1 mg p.o. q.i.d.



DISCHARGE INSTRUCTIONS:  The patient was advised that if she has any recurrence of her symptoms to pr
esent to her PMD or to the nearest Emergency Department immediately.



FOLLOWUP:  The patient to follow up with her PMD within 1 week of discharge.





__________________________________________

Christopher Philip MD







cc:



DD: 04/26/2017 08:43:04  493

TT: 04/26/2017 12:27:43

Job # [id number]

jn

## 2018-03-16 ENCOUNTER — HOSPITAL ENCOUNTER (EMERGENCY)
Dept: HOSPITAL 42 - ED | Age: 68
Discharge: HOME | End: 2018-03-16
Payer: MEDICARE

## 2018-03-16 VITALS — HEART RATE: 75 BPM | SYSTOLIC BLOOD PRESSURE: 126 MMHG | RESPIRATION RATE: 17 BRPM | DIASTOLIC BLOOD PRESSURE: 70 MMHG

## 2018-03-16 VITALS — OXYGEN SATURATION: 98 %

## 2018-03-16 VITALS — TEMPERATURE: 99.3 F

## 2018-03-16 VITALS — BODY MASS INDEX: 23.3 KG/M2

## 2018-03-16 DIAGNOSIS — R05: ICD-10-CM

## 2018-03-16 DIAGNOSIS — E87.1: ICD-10-CM

## 2018-03-16 DIAGNOSIS — E11.65: ICD-10-CM

## 2018-03-16 DIAGNOSIS — I25.10: ICD-10-CM

## 2018-03-16 DIAGNOSIS — I10: ICD-10-CM

## 2018-03-16 DIAGNOSIS — J40: ICD-10-CM

## 2018-03-16 DIAGNOSIS — E86.0: Primary | ICD-10-CM

## 2018-03-16 LAB
ALBUMIN SERPL-MCNC: 3.4 G/DL
ALBUMIN/GLOB SERPL: 1.3 {RATIO}
ALT SERPL-CCNC: 53 U/L
APPEARANCE UR: CLEAR
AST SERPL-CCNC: 32 U/L
BASOPHILS # BLD AUTO: 0.04 K/MM3
BASOPHILS NFR BLD: 0.6 %
BILIRUB UR-MCNC: NEGATIVE MG/DL
BUN SERPL-MCNC: 14 MG/DL
CALCIUM SERPL-MCNC: 9.2 MG/DL
COLOR UR: (no result)
EOSINOPHIL # BLD: 0 10*3/UL
EOSINOPHIL NFR BLD: 0.1 %
ERYTHROCYTE [DISTWIDTH] IN BLOOD BY AUTOMATED COUNT: 13.5 %
GFR NON-AFRICAN AMERICAN: > 60
GLUCOSE UR STRIP-MCNC: >=1000 MG/DL
GRANULOCYTES # BLD: 4.42 10*3/UL
GRANULOCYTES NFR BLD: 64.2 %
HGB BLD-MCNC: 13.3 G/DL
LEUKOCYTE ESTERASE UR-ACNC: NEGATIVE LEU/UL
LYMPHOCYTES # BLD: 1.5 10*3/UL
LYMPHOCYTES NFR BLD AUTO: 21.4 %
MCH RBC QN AUTO: 30 PG
MCHC RBC AUTO-ENTMCNC: 35.1 G/DL
MCV RBC AUTO: 85.6 FL
MONOCYTES # BLD AUTO: 0.9 10*3/UL
MONOCYTES NFR BLD: 13.7 %
PH UR STRIP: 6 [PH]
PLATELET # BLD: 280 10^3/UL
PMV BLD AUTO: 9.5 FL
PROT UR STRIP-MCNC: NEGATIVE MG/DL
RBC # BLD AUTO: 4.43 10^6/UL
RBC # UR STRIP: (no result) /UL
RBC #/AREA URNS HPF: NEGATIVE /HPF
SP GR UR STRIP: 1.01
UROBILINOGEN UR STRIP-ACNC: 0.2 E.U./DL
WBC # BLD AUTO: 6.9 10^3/UL
WBC #/AREA URNS HPF: NEGATIVE /HPF

## 2018-03-16 PROCEDURE — 87804 INFLUENZA ASSAY W/OPTIC: CPT

## 2018-03-16 PROCEDURE — 96374 THER/PROPH/DIAG INJ IV PUSH: CPT

## 2018-03-16 PROCEDURE — 80053 COMPREHEN METABOLIC PANEL: CPT

## 2018-03-16 PROCEDURE — 85025 COMPLETE CBC W/AUTO DIFF WBC: CPT

## 2018-03-16 PROCEDURE — 82948 REAGENT STRIP/BLOOD GLUCOSE: CPT

## 2018-03-16 PROCEDURE — 99285 EMERGENCY DEPT VISIT HI MDM: CPT

## 2018-03-16 PROCEDURE — 71046 X-RAY EXAM CHEST 2 VIEWS: CPT

## 2018-03-16 PROCEDURE — 94640 AIRWAY INHALATION TREATMENT: CPT

## 2018-03-16 PROCEDURE — 93005 ELECTROCARDIOGRAM TRACING: CPT

## 2018-03-16 PROCEDURE — 81001 URINALYSIS AUTO W/SCOPE: CPT

## 2018-03-16 PROCEDURE — 96361 HYDRATE IV INFUSION ADD-ON: CPT

## 2018-03-16 PROCEDURE — 96375 TX/PRO/DX INJ NEW DRUG ADDON: CPT

## 2018-03-16 RX ADMIN — IPRATROPIUM BROMIDE AND ALBUTEROL SULFATE SCH ML: .5; 3 SOLUTION RESPIRATORY (INHALATION) at 12:41

## 2018-03-16 RX ADMIN — IPRATROPIUM BROMIDE AND ALBUTEROL SULFATE SCH ML: .5; 3 SOLUTION RESPIRATORY (INHALATION) at 12:15

## 2018-03-16 RX ADMIN — IPRATROPIUM BROMIDE AND ALBUTEROL SULFATE SCH ML: .5; 3 SOLUTION RESPIRATORY (INHALATION) at 12:33

## 2018-03-16 NOTE — ED PDOC
Arrival/HPI





- General


Chief Complaint: Flu-like Symptoms


Time Seen by Provider: 18 12:00


Historian: Patient, Family





- History of Present Illness


Narrative History of Present Illness (Text): 





18 12:00


pt p/w + 3-4 days onset of worsening coughing, with white productive sputum, + 

sore throat, last 2-3 days of intermittent fever, Tmax ~ 101.2; + shivers/

weakness, mild diffuse body pain, decr appetite, + malaise; pt states no sweats

, no cp, + intermittent sob, no palpitations, no abd pain, + nausea, no vomiting

, no numbness/tingling, no urinary/bowel changes; pt denied LOC, + 

lightheadedness, + general weakness, no fall/trauma/sick contact, no travel


pt arrived to ED for further eval


pt's without other complaints





pt did not receive annual flu shoot


pt is a ~ 1 ppd smoker recently decreased down to 10 cig/day smoker





PCP: Figueroa


Time/Duration: < week (~3-4 days)


Symptom Onset: Gradual


Symptom Course: Worsening


Activities at Onset: Rest


Context: Home





Past Medical History





- Provider Review


Nursing Documentation Reviewed: Yes





- Travel History


Have you recently traveled outside [place] w/in the past 3 mons?: No





- Past History


Past History: No Previous





- Infectious Disease


Hx of Infectious Diseases: None





- Tetanus Immunization


Tetanus Immunization: Unknown





- Cardiac


Hx Cardiac Disorders: Yes (CAD, PAD, angioplasty)


Hx Hypertension: Yes





- Pulmonary


Hx Chronic Obstructive Pulmonary Disease (COPD): Yes





- Neurological


Hx Neurological Disorder: Yes


Hx Dizziness: Yes





- HEENT


Hx HEENT Disorder: No





- Renal


Hx Renal Disorder: No





- Endocrine/Metabolic


Hx Diabetes Mellitus Type 2: Yes





- Hematological/Oncological


Hx Blood Disorders: No





- Integumentary


Hx Dermatological Disorder: Yes


Hx Psoriasis: Yes





- Musculoskeletal/Rheumatological


Hx Musculoskeletal Disorders: Yes (LUMBAR RADICULOPATHY,L ULNAR NERVE 

ENTRAPMENT ,DECOMPRESSION)


Hx Back Pain: Yes





- Gastrointestinal


Hx Gastrointestinal Disorders: Yes


Hx Gastroesophageal Reflux: Yes





- Genitourinary/Gynecological


Hx Genitourinary Disorders: Yes (1  1 MISCARRIAGE)


Other/Comment: OOPHORECTOMY





- Psychiatric


Hx Psychophysiologic Disorder: Yes


Hx Anxiety: Yes


Hx Depression: Yes


Hx Emotional Abuse: No


Hx Physical Abuse: No


Hx Substance Use: No





- Surgical History


Hx Cardiac Catheterization: Yes ()


Hx Coronary Stent: Yes (X2)


Other/Comment: trigger finger and ulna sx





- Anesthesia


Hx Anesthesia: Yes


Hx Anesthesia Reactions: No


Hx Malignant Hyperthermia: No





- Suicidal Assessment


Feels Threatened In Home Enviroment: No





Family/Social History





- Physician Review


Nursing Documentation Reviewed: Yes


Family/Social History: No Known Family HX


Smoking Status: Current Some Days Smoker


Hx Alcohol Use: No


Hx Substance Use: No


Hx Substance Use Treatment: No





Allergies/Home Meds


Allergies/Adverse Reactions: 


Allergies





bacitracin Allergy (Verified 18 11:47)


 SWELLING


latex Allergy (Verified 18 11:47)


 RASH


Penicillins Allergy (Verified 18 11:47)


 ANAPHYLAXIS








Home Medications: 


 Home Meds











 Medication  Instructions  Recorded  Confirmed


 


Atorvastatin Calcium [Lipitor] 20 mg PO DAILY 13


 


QUEtiapine [Seroquel] 100 mg PO DAILY 14


 


Alprazolam [Xanax] 1 mg PO QID 07/25/15 03/16/18


 


Aspirin [Ecotrin] 325 mg PO DAILY 09/26/15 03/16/18


 


Esomeprazole Magnesium [Nexium] 40 mg PO DAILY 16


 


Metoprolol Succinate [Toprol XL] 200 mg PO DAILY 10/06/16 03/16/18


 


Tiotropium [Spiriva] 18 mcg IH DAILY 10/06/16 03/16/18


 


Albuterol Sulfate [Proair Hfa] 1 inh INH PRN PRN 17


 


Fluticasone/Salmeterol 250/50 1 inh INH BID 17





[Advair Diskus 250/50]   


 


Insulin Lispro [humALOG] 0 units XX PRN PRN 17


 


Methylprednisolone 0 mg PO .AS PRESCRIBED 17


 


Mometasone 0.1% [Elocon Cream] 1 appful TOP PRN PRN 17


 


Valsartan [Diovan] 25 mg PO DAILY 17


 


Varenicline Tartrate [Chantix] 0 mg PO BID 17














Review of Systems





- Review of Systems


Constitutional: Fatigue, Fevers.  absent: Night Sweats


Eyes: Normal


ENT: Normal


Respiratory: SOB, Cough, Sputum (whitish material)


Cardiovascular: Normal


Gastrointestinal: Nausea.  absent: Abdominal Pain, Diarrhea, Vomiting


Genitourinary Female: Normal


Musculoskeletal: Normal


Skin: Normal


Neurological: Dizziness


Endocrine: Normal


Hemo/Lymphatic: Normal


Psychiatric: Normal





Physical Exam


Vital Signs Reviewed: Yes


Vital Signs











  Temp Pulse Resp BP Pulse Ox


 


 18 15:36   75  17  126/70  98


 


 18 15:24   79  18  124/68  98


 


 18 13:51   82  18  128/65  98


 


 18 11:50  99.3 F  80  19  126/46 L  97


 


 18 11:49  99.3 F  80  19  126/46 L  97











Temperature: Afebrile


Blood Pressure: Hypotensive


Pulse: Regular


Respiratory Rate: Normal


Appearance: Positive for: Well-Appearing, Non-Toxic, Other (actively coughing, 

alert/awake, GCS = 15, oriented x 3, cooperative, NAD, uncomfortable, follows 

command with ease)


Pain Distress: None


Mental Status: Positive for: Alert and Oriented X 3





- Systems Exam


Head: Present: Atraumatic, Normocephalic


Pupils: Present: PERRL, Other (no nystagmus, no photophobia, sclera anicteric)


Extroacular Muscles: Present: EOMI


Conjunctiva: Present: Normal


Ears: Present: Normal


Mouth: Present: Normal Teeth, Other (mild dry oral mucosa, no drooling/stridor, 

no exudate/lesions, uvula/tongue are midline)


Pharnyx: Present: Normal


Nose (External): Present: Atraumatic


Nose (Internal): Present: Normal Inspection


Neck: Present: Normal Range of Motion, Trachea Midline, Other (no nuchal 

rigidity, no meningeal signs, no midline tenderness, no step off).  No: MIDLINE 

TENDERNESS


Respiratory/Chest: Present: Clear to Auscultation, Good Air Exchange


Cardiovascular: Present: Regular Rate and Rhythm, Normal S1, S2, Other (no m/r/r

)


Abdomen: Present: Normal Bowel Sounds, Other (well nourished female, no focal 

tenderness, no masses/rebound/guarding/rigidity, no leo's sign, no mcburney'

s point tenderness)


Back: Present: Normal Inspection.  No: Midline Tenderness


Upper Extremity: Present: Normal Inspection, Normal ROM, NORMAL PULSES, 

Neurovascularly Intact, Capillary Refill < 2s


Lower Extremity: Present: Normal Inspection, NORMAL PULSES, Normal ROM, 

Neurovascularly Intact


Neurological: Present: GCS=15, CN II-XII Intact, Speech Normal


Skin: Present: Warm, Normal Color, Other (cap refill ~ 1 sec, mild pallor, no 

ulcerations/petechiae)


Psychiatric: Present: Alert, Oriented x 3





Medical Decision Making


ED Course and Treatment: 





18 12:04


Impression: cough/weakness/fever, sob


i have consider all the differential diagnosis regarding pt's chief medical 

complaints/clinical findings, including but are not limited to: cough/weakness/

sob





A/P: cough/fever/weakness, sob


- xray


- labs


- ekg


- observe


- supportive care





18 14:22


i spoke to Dr Cooley, pt's PCP, made aware of pt's medical complaints/ED 

diagnosis, agrees with ED mgt/txt, would like pt to receive zithromax po and pt 

can be discharged home with outpt f/u in the office in the morning





pt is doing well


pt with less coughing noted


pt felt improved





vital signs improved


FS ~ 260s





pt is made aware of her medical results


pt is encouraged smoking cessation (pt is working on it)


pt is encouraged fluid hydration


pt will f/u as directed


pt will be discharged home


Re-evaluation Time: 14:24


Reassessment Condition: Improved





- Lab Interpretations


Lab Results: 








 18 12:30 





 18 12:30 





 Lab Results





18 12:30: Influenza Typ A,B (EIA) Negative for flu a/b


18 12:30: WBC 6.9, RBC 4.43, Hgb 13.3, Hct 37.9, MCV 85.6, MCH 30.0, MCHC 

35.1, RDW 13.5, Plt Count 280, MPV 9.5, Gran % 64.2, Lymph % (Auto) 21.4 L, 

Mono % (Auto) 13.7 H, Eos % (Auto) 0.1 L, Baso % (Auto) 0.6, Gran # 4.42, Lymph 

# (Auto) 1.5, Mono # (Auto) 0.9 H, Eos # (Auto) 0.0, Baso # (Auto) 0.04


18 12:30: Sodium 127 L, Potassium 4.0, Chloride 96 L, Carbon Dioxide 22, 

Anion Gap 13, BUN 14, Creatinine 0.7, Est GFR (African Amer) > 60, Est GFR (Non-

Af Amer) > 60, Random Glucose 352 H* D, Calcium 9.2, Total Bilirubin 0.3, AST 32

, ALT 53, Alkaline Phosphatase 223 H, Total Protein 5.9, Albumin 3.4, Globulin 

2.5, Albumin/Globulin Ratio 1.3


18 12:20: Urine Color Light yellow, Urine Appearance Clear, Urine pH 6.0, 

Ur Specific Gravity 1.010, Urine Protein Negative, Urine Glucose (UA) >=1000, 

Urine Ketones Negative, Urine Blood Trace-intact H, Urine Nitrate Negative, 

Urine Bilirubin Negative, Urine Urobilinogen 0.2, Ur Leukocyte Esterase Negative

, Urine RBC Negative, Urine WBC Negative








I have reviewed the lab results: Yes


Interpretation: Abnormal lab values (decr NA, elevated GLUC)





- RAD Interpretation


Narrative RAD Interpretations (Text): 





18 14:00


Chest X-ray: Creator : Florian Gilbert MD


FINDINGS:


LUNGS: No active pulmonary disease.


PLEURA: No significant pleural effusion identified. No pneumothorax apparent.


CARDIOVASCULAR: Normal.


OSSEOUS STRUCTURES: No significant abnormalities.


VISUALIZED UPPER ABDOMEN: Normal.


OTHER FINDINGS: None.


IMPRESSION: No active disease.




















Radiology Orders: 








18 12:02


CHEST TWO VIEWS (PA/LAT) [RAD] Stat 











: Radiologist





- EKG Interpretation


EKG Interpretation (Text): 





18 14:24


NSR at 85 bpm, LAD, no ectopy, LBBB, qs in leads III, V1-3, inverted T in leads 

I L, no st changes, ABNL EKG; unchanged compare with old ekg 2017


Interpreted by ED Physician: Yes


Type: 12 lead EKG


Comparison: Similar to previous EKG





- Medication Orders


Current Medication Orders: 











Discontinued Medications





Albuterol/Ipratropium (Duoneb 3 Mg/0.5 Mg (3 Ml) Ud)  3 ml IH Q15M BEVERLY


   Stop: 18 12:46


   Last Admin: 18 12:41  Dose: 3 ml





Azithromycin (Zithromax)  500 mg PO STAT STA


   PRN Reason: Protocol


   Stop: 18 14:23


   Last Admin: 18 14:46  Dose: 500 mg





Guaifenesin/Dextromethorphan (Robitussin Dm)  10 ml PO ONCE ONE


   Stop: 18 12:02


   Last Admin: 18 12:15  Dose: 10 ml





Sodium Chloride (Sodium Chloride 0.9%)  1,000 mls @ 999 mls/hr IV .Q1H1M STA


   Stop: 18 15:21


   Last Admin: 18 14:27  Dose: 999 mls/hr





eMAR Start Stop


 Document     18 14:27  SF  (Rec: 18 14:27  SF  [facility] [place] [facility])


     Intravenous Solution


      Start Date                                 18


      Start Time                                 14:27


      End Date                                   18


      End time                                   15:28


      Total Infusion Time                        61





Methylprednisolone (Solu-Medrol)  125 mg IVP STAT STA


   Stop: 18 12:03


   Last Admin: 18 12:15  Dose: 125 mg





IVP Administration


 Document     18 12:15  SF  (Rec: 18 12:15  SF  [facility] [place] [facility])


     Charges for Administration


      # of IVP Administrations                   1





Ondansetron HCl (Zofran Inj)  4 mg IVP STAT STA


   Stop: 18 12:04


   Last Admin: 18 12:15  Dose: 4 mg





IVP Administration


 Document     18 12:15  SF  (Rec: 18 12:15  SF  [facility] [place] [facility])


     Charges for Administration


      # of IVP Administrations                   1














Disposition/Present on Arrival





- Present on Arrival


Any Indicators Present on Arrival: No


History of DVT/PE: No


History of Uncontrolled Diabetes: Yes


Urinary Catheter: No


History of Decub. Ulcer: No


History Surgical Site Infection Following: None





- Disposition


Have Diagnosis and Disposition been Completed?: Yes


Diagnosis: 


 Bronchitis, Cough, Acute hyperglycemia, Mild dehydration, Hyponatremia





Disposition: HOME/ ROUTINE


Disposition Time: 15:00


Patient Plan: Discharge


Condition: STABLE


Discharge Instructions (ExitCare):  Cough in Adults, Hyperglycemia, Adult, 

Dehydration, Adult (DC), Acute Bronchitis, Hyponatremia


Print Language: ENGLISH


Additional Instructions: 


Make sure to see your doctor in 1-2 days (tomorrow in the office)


DRINK PLENTY OF FLUIDS


encourage smoking cessation


take your medications as prescribed


RETURN TO ED IF worse pain, cant breath, persistent vomiting, high fever >101-

102 for hours, altered behavior, unable to urinate, heavy/persistent bleeding, 

passing out, chest pain, or other medical emergencies


Prescriptions: 


Azithromycin [Zithromax] 250 mg PO DAILY #4 tab


guaiFENesin/Dextromethorphan [guaiFENesin-DM] 10 ml PO TID PRN #100 ml


 PRN Reason: Cough


Referrals: 


Tamera SILVER,Christopher REEVES MD [Primary Care Provider] - Follow up with primary


Forms:  [employer] (English)

## 2018-03-16 NOTE — RAD
HISTORY:

sob/cough  



COMPARISON:

No prior.



TECHNIQUE:

Chest PA and lateral



FINDINGS:



LUNGS:

No active pulmonary disease.



PLEURA:

No significant pleural effusion identified. No pneumothorax apparent.



CARDIOVASCULAR:

Normal.



OSSEOUS STRUCTURES:

No significant abnormalities.



VISUALIZED UPPER ABDOMEN:

Normal.



OTHER FINDINGS:

None.



IMPRESSION:

No active disease.

## 2018-03-17 NOTE — CARD
--------------- APPROVED REPORT --------------





EKG Measurement

Heart [id number]

CA [id number]

[id number]

[id number]

[id number]



<Conclusion>

Normal sinus rhythm

Left bundle branch block

No change

## 2018-06-27 ENCOUNTER — HOSPITAL ENCOUNTER (OUTPATIENT)
Dept: HOSPITAL 42 - CATH | Age: 68
Discharge: HOME | End: 2018-06-27
Attending: INTERNAL MEDICINE
Payer: MEDICARE

## 2018-06-27 VITALS — OXYGEN SATURATION: 94 % | DIASTOLIC BLOOD PRESSURE: 48 MMHG | HEART RATE: 60 BPM | SYSTOLIC BLOOD PRESSURE: 139 MMHG

## 2018-06-27 VITALS — BODY MASS INDEX: 26.3 KG/M2

## 2018-06-27 VITALS — TEMPERATURE: 98 F

## 2018-06-27 VITALS — RESPIRATION RATE: 18 BRPM

## 2018-06-27 DIAGNOSIS — R06.09: ICD-10-CM

## 2018-06-27 DIAGNOSIS — I10: ICD-10-CM

## 2018-06-27 DIAGNOSIS — I25.10: Primary | ICD-10-CM

## 2018-06-27 DIAGNOSIS — Z95.5: ICD-10-CM

## 2018-06-27 PROCEDURE — 86850 RBC ANTIBODY SCREEN: CPT

## 2018-06-27 PROCEDURE — 86900 BLOOD TYPING SEROLOGIC ABO: CPT

## 2018-06-27 PROCEDURE — 82948 REAGENT STRIP/BLOOD GLUCOSE: CPT

## 2018-06-27 PROCEDURE — 36415 COLL VENOUS BLD VENIPUNCTURE: CPT

## 2018-06-27 PROCEDURE — 93458 L HRT ARTERY/VENTRICLE ANGIO: CPT

## 2018-06-27 PROCEDURE — 99153 MOD SED SAME PHYS/QHP EA: CPT

## 2018-06-27 PROCEDURE — 99152 MOD SED SAME PHYS/QHP 5/>YRS: CPT

## 2018-06-27 NOTE — CARDCATH
PROCEDURE DATE:  06/27/2018



PROCEDURES:

1.  Selective left and right coronary angiography.

2.  Left ventriculography.

3.  Right femoral arteriography.

4.  Angio-Seal deployment.



HISTORY:  This is a 67-year-old woman with known coronary artery disease,

recent exertional dyspnea, chest pain and an abnormal stress test showing a

larger anteroseptal ischemic defect.  Cardiac catheterization was advised.



INDICATIONS:  As above.



FINDINGS:

HEMODYNAMICS:  Aortic pressure was 170/76 with left ventricular pressure of

170/15.



CORONARY ANATOMY:

1.  The left mainstem was small and normal.

2.  The left anterior descending artery was a small vessel and had diffuse

disease throughout its course.  In the mid portion of the vessel, there was

a focal 70% stenosis with multiple 50-60% lesions noted throughout the

vessel.  The vessel appeared to be less than a 2 mm in caliber and not

amenable to coronary intervention.

3.  The left circumflex artery gave rise to two obtuse marginal branches,

which were also fairly small.  In the midportion of the left circumflex

artery, there was a 60% stenosis as well as evidence of mild-to-moderate

diffuse distal disease.

4.  The right coronary artery was fairly large and dominant.  The

previously placed stents in the proximal mid segment of the vessel had no

evidence of significant re-stenosis.  The PDA and PLB branches had evidence

of mild diffuse disease.



LEFT VENTRICULOGRAPHY:  A hand injection was performed in the left

ventricle revealing normal wall motion with an ejection fraction of 65%. 

There was no aortic valve gradient noted on catheter pullback.  Mitral

regurgitation was not assessed.



RIGHT FEMORAL ARTERIOGRAPHY:  A right femoral arteriogram was performed in

the LIRA projection.  This revealed no evidence of significant disease in

the appropriate level of arterial puncture.  The puncture site was then

closed with deployment of an Angio-Seal device.



CONCLUSIONS:

1.  Severe diffuse left anterior descending artery and circumflex disease.

2.  Patent right coronary artery stents.

3.  Preserved left ventricular function.



RECOMMENDATIONS:  Given the above findings, intensification of medical

therapy is advised.  Amlodipine will be added to her regimen of statin,

beta-blocker, aspirin and Plavix.



Smoking abstinence was strongly encouraged.







__________________________________________

Paresh Wilson MD



cc:  Christopher Philip MD



DD:  06/27/2018 10:42:53

DT:  06/27/2018 11:51:16

[facility] # [id number]

RENATA

## 2018-12-27 ENCOUNTER — HOSPITAL ENCOUNTER (EMERGENCY)
Dept: HOSPITAL 42 - ED | Age: 68
Discharge: HOME | End: 2018-12-27
Payer: MEDICARE

## 2018-12-27 VITALS — OXYGEN SATURATION: 100 % | TEMPERATURE: 98 F | DIASTOLIC BLOOD PRESSURE: 69 MMHG | SYSTOLIC BLOOD PRESSURE: 114 MMHG

## 2018-12-27 VITALS — BODY MASS INDEX: 26.3 KG/M2

## 2018-12-27 VITALS — HEART RATE: 71 BPM | RESPIRATION RATE: 18 BRPM

## 2018-12-27 DIAGNOSIS — E11.9: ICD-10-CM

## 2018-12-27 DIAGNOSIS — M79.622: Primary | ICD-10-CM

## 2018-12-27 DIAGNOSIS — S23.41XA: ICD-10-CM

## 2018-12-27 DIAGNOSIS — I10: ICD-10-CM

## 2018-12-27 DIAGNOSIS — J44.9: ICD-10-CM

## 2018-12-27 DIAGNOSIS — E78.5: ICD-10-CM

## 2018-12-27 NOTE — RAD
PROCEDURE:  Radiographs of the left humerus.



HISTORY:

arm pain s/p trauma



COMPARISON:

None.



FINDINGS:



BONES:

Normal. No fracture or focal lesion. 



SOFT TISSUES:

Normal.



OTHER FINDINGS:

None.



IMPRESSION:

Normal radiographs of left humerus.

## 2018-12-27 NOTE — RAD
Date of service: 



12/27/2018



PROCEDURE:  Radiographs of the chest and bilateral ribs



HISTORY:

Posttraumatic rib pain 



COMPARISON:

03/16/2018 two-view chest 



TECHNIQUE:

Frontal radiograph of the chest and multiple oblique radiographs of 

the bilateral ribs were obtained.



FINDINGS:



RIGHT RIBS:

No fracture or focal lesion visualized.



LEFT RIBS:

No fracture or focal lesion visualized.



LUNGS:

Clear.



PLEURA:

No pneumothorax or pleural fluid.



CARDIOVASCULAR:

Normal cardiac size. No pulmonary vascular congestion. 



Atherosclerotic calcifications identified primarily aortic arch.



OTHER FINDINGS:

None.



IMPRESSION:

Unremarkable radiographs of the chest and bilateral ribs. No rib 

fracture.

## 2018-12-27 NOTE — ED PDOC
Arrival/HPI





- General


Chief Complaint: Trauma


Time Seen by Provider: 18 16:14


Historian: Patient





- History of Present Illness


Narrative History of Present Illness (Text): 





18 18:10


67yo female with pmhx of COPD, HTN, DM, Hyperlipdemia, anxiety who present to ED

with complaint of left upper arm pain and b/l rib pain s/p trauma at 0400am this

morning. States she had a mechanical fall and while trying to grab a door key 

she fell and injured her arm and ribs. States she came to ED this evening for 

the persistent pain. Reports pain is with movement and deep inspiration. She did

not take any analgesic for the pain. She denies hitting her head anywhere. 

Denies headache, focal weakness, back pain, nausea, vomiting, chest pain, SOB, 

any other complaint.











Past Medical History





- Provider Review


Nursing Documentation Reviewed: Yes





- Past History


Past History: No Previous





- Infectious Disease


Hx of Infectious Diseases: None





- Tetanus Immunization


Tetanus Immunization: Unknown





- Reproductive


Menopause: Yes





- Cardiac


Hx Pacemaker: No


Other/Comment: cardiac stent





- Pulmonary


Hx Chronic Obstructive Pulmonary Disease (COPD): Yes





- Neurological


Hx Paralysis: No





- HEENT


Hx HEENT Disorder: No





- Renal


Hx Renal Disorder: No





- Endocrine/Metabolic


Hx Diabetes Mellitus Type 2: Yes





- Hematological/Oncological


Hx Blood Transfusions: No





- Integumentary


Hx Dermatological Disorder: Yes


Hx Psoriasis: Yes





- Musculoskeletal/Rheumatological


Hx Musculoskeletal Disorders: Yes (LUMBAR RADICULOPATHY,L ULNAR NERVE ENTRAPMENT

,DECOMPRESSION)





- Gastrointestinal


Hx Gastrointestinal Disorders: Yes


Hx Gastroesophageal Reflux: Yes





- Genitourinary/Gynecological


Hx Genitourinary Disorders: Yes (1  1 MISCARRIAGE)


Other/Comment: OOPHORECTOMY





- Psychiatric


Hx Panic Disorder: Yes


Hx Substance Use: No





- Surgical History


Hx Cardiac Catheterization: Yes (2008)


Hx Coronary Stent: Yes (X2)


Other/Comment: trigger finger and ulna sx





- Anesthesia


Hx Anesthesia Reactions: No


Hx Malignant Hyperthermia: No





- Suicidal Assessment


Feels Threatened In Home Enviroment: No





Family/Social History





- Physician Review


Nursing Documentation Reviewed: Yes


Family/Social History: Unknown Family HX


Smoking Status: Current Some Days Smoker


Hx Alcohol Use: No


Hx Substance Use: No


Hx Substance Use Treatment: No





Allergies/Home Meds


Allergies/Adverse Reactions: 


Allergies





bacitracin Allergy (Verified 18 11:47)


   SWELLING


latex Allergy (Verified 18 11:47)


   RASH


Penicillins Allergy (Verified 18 11:47)


   ANAPHYLAXIS








Home Medications: 


                                    Home Meds











 Medication  Instructions  Recorded  Confirmed


 


Atorvastatin Calcium [Lipitor] 40 mg PO DAILY 13


 


QUEtiapine [Seroquel] 50 mg PO DAILY 14


 


Alprazolam [Xanax] 1 mg PO QID 07/25/15 06/27/18


 


Aspirin [Ecotrin] 325 mg PO DAILY 09/26/15 06/27/18


 


Esomeprazole Magnesium [Nexium] 40 mg PO DAILY 16


 


Metoprolol Succinate XL [Toprol XL] 100 mg PO DAILY 10/06/16 06/27/18


 


Albuterol Sulfate [Proair Hfa] 1 inh INH PRN PRN 17


 


Insulin Lispro [humALOG] 5 units SC PRN PRN 17


 


Mometasone 0.1% [Elocon Cream] 1 appful TOP PRN PRN 17


 


Desvenlafaxine Succinate [Pristiq] 50 mg PO DAILY 18


 


Fluticasone Furoate [Arnuity 1 puff INH DAILY 18





Ellipta]   


 


Insulin Glargine, Recombina 15 units SC HS 18





[Lantus]   


 


Umeclidinium Brm/Vilanterol Tr 1 puff INH DAILY 18





[Anoro Ellipta 62.5-25 Mcg INH]   


 


amLODIPine [Norvasc] 5 mg PO DAILY 18














Review of Systems





- Physician Review


All systems were reviewed & negative as marked: Yes





- Review of Systems


Constitutional: Normal


Eyes: Normal


ENT: Normal


Respiratory: Normal


Cardiovascular: Normal


Gastrointestinal: Normal


Genitourinary Female: Normal


Musculoskeletal: Arthralgias (Left upper arm and b/l ribs)


Skin: Normal


Neurological: Normal


Endocrine: Normal


Hemo/Lymphatic: Normal


Psychiatric: Normal





Physical Exam


Vital Signs Reviewed: Yes





Vital Signs











  Temp Pulse Resp BP Pulse Ox


 


 18 16:28  98.4 F  71  18  131/46 L  96











Temperature: Afebrile


Blood Pressure: Normal


Pulse: Regular


Respiratory Rate: Normal


Appearance: Positive for: Well-Appearing, Non-Toxic, Comfortable


Pain Distress: None


Mental Status: Positive for: Alert and Oriented X 3





- Systems Exam


Head: Present: Atraumatic, Normocephalic


Pupils: Present: PERRL


Extroacular Muscles: Present: EOMI


Conjunctiva: Present: Normal


Mouth: Present: Moist Mucous Membranes


Neck: Present: Normal Range of Motion


Respiratory/Chest: Present: Clear to Auscultation, Good Air Exchange, Tender to 

Palpation (B/L anterior ribs).  No: Respiratory Distress, Accessory Muscle Use, 

Wheezes, Decreased Breath Sounds, Rales, Retracting, Rhonchi


Cardiovascular: Present: Regular Rate and Rhythm, Normal S1, S2.  No: Murmurs


Abdomen: No: Tenderness, Distention, Peritoneal Signs


Back: Present: Normal Inspection


Upper Extremity: Present: Normal ROM, NORMAL PULSES, Tenderness (Proximal upper 

arm), Neurovascularly Intact.  No: Cyanosis, Edema, Swelling, Temperature 

Abnormalties, Deformity


Lower Extremity: Present: Normal Inspection.  No: Edema


Neurological: Present: GCS=15, CN II-XII Intact, Speech Normal


Skin: Present: Warm, Dry, Normal Color.  No: Rashes


Psychiatric: Present: Alert, Oriented x 3, Normal Insight, Normal Concentration





Medical Decision Making


ED Course and Treatment: 





18 19:08


PT  in ED for b/l ribs pain and left upper am pain s/p trauma this morning.





B/L ribs/chest xray


Left humerus xray


Tramadol








B/L ribs


IMPRESSION:


Unremarkable radiographs of the chest and bilateral ribs. No rib fracture.








Left humerus


IMPRESSION:


Normal radiographs of left humerus.











Pt's pain improved in ED with medication in ED





Result was DW the pt and she was DC home with Tramadol. Referred to ortho





- RAD Interpretation


Radiology Orders: 











18 16:33


RIBS BILATERAL W/PA CHEST [RAD] Stat 





18 16:34


HUMERUS LEFT [RAD] Stat 














- Medication Orders


Current Medication Orders: 














Discontinued Medications





Tramadol HCl (Ultram)  50 mg PO STAT STA


   Stop: 18 16:35


   Last Admin: 18 17:43  Dose: 50 mg





MAR Pain Assessment


 Document     18 17:43  OCS  (Rec: 18 17:48  OCS  [id number])


     Pain Reassessment


      Is this a pain reassessment?               No


     Sleep


      Is patient sleeping during reassessment?   No


     Presence of Pain


      Presence of Pain                           Yes


     Pain Scale Used


     Protocol:  PSCALES


      Pain Scale Used                            Numeric


     Location


      Pain Location Body Site                    Back


     Description


      Description                                Constant


      Intensity of Pain at present               10


      Pain Behavior                              Facial Grimacing


      Aggravating Factors                        ADL's














Disposition/Present on Arrival





- Present on Arrival


Any Indicators Present on Arrival: No


History of DVT/PE: No


History of Uncontrolled Diabetes: Yes


Urinary Catheter: No


History of Decub. Ulcer: No


History Surgical Site Infection Following: None





- Disposition


Have Diagnosis and Disposition been Completed?: Yes


Diagnosis: 


 Rib sprain, Arm pain





Disposition: HOME/ ROUTINE


Disposition Time: 19:00


Patient Plan: Discharge


Patient Problems: 


                             Current Active Problems











Problem Status Onset


 


Arm pain Acute 


 


Rib sprain Acute 











Condition: STABLE


Discharge Instructions (ExitCare):  Muscle Strain, Shoulder Sprain (DC)


Additional Instructions: 


Follow up with your Doctor/orthopedist


Return to ED for any new or worsening symptoms


Prescriptions: 


traMADol [Ultram] 50 mg PO Q6 #10 tab


Referrals: 


Florian Min DO [Staff Provider] - Follow up with primary


Forms:  [email or web address] (English)

## 2019-03-26 NOTE — HP
HISTORY OF PRESENT ILLNESS:  

The patient is a 68-year-old woman with a past medical history of severe COPD 
with active smoking who presented for evaluation of a 5 day history of chest 
tightness, dyspnea, wheeze and cough productive of green sputum.  She was 
initially seen in her PMD's office 2 days prior with the aforementioned 
symptoms.  She was diagnosed with COPD exacerbation and started on Levaquin and 
Prednisone.  Two days later she called her PMD because her symptoms had not 
improved.  She was advised to continue her current treatment and that if she had
no improvement over the following 24 hours to return for reevaluation or present
to the nearest ED.  Over the following 24 hours she demonstrated no improvement 
and thus opted for ED evaluation. In the ED she was afebrile and hemodynamically
stable however in moderate respiratory distress.  She received multiple rounds 
of bronchodilator treatments without resolution of her wheeze and as such was 
admitted for continued management.



PAST MEDICAL HISTORY:  

As per HPI, also CAD s/p PCI with stent placement, hypertension, hyperlipidemia,
insulin-dependent diabetes mellitus, anxiety disorder and sciatica.



PAST SURGICAL HISTORY:  

Surgical repair of trigger finger.



ALLERGIES:  

Bacitracin, Penicillin and Latex.



MEDICATIONS:  

Aspirin 325 mg p.o. daily, Lipitor 40 mg p.o. daily, Norvasc 5 mg p.o. daily, 
Gabapentin 300 mg p.o. t.i.d., Seroquel 50 mg p.o. qhs, Protonix 40 mg p.o. 
daily, Toprol XL 50 mg p.o. daily, Humalog 10 units SC t.i.d. with meals, 
Levemir 8 units SC at bedtime, Xanax 1 mg p.o. q. 6 hours p.r.n. anxiety, Dulera
200 mcg 2 puffs b.i.d. and ProAir HFA 2 puffs q. 4-6 hours p.r.n. 
dyspnea/wheeze.



FAMILY HISTORY:  

Significant for hypertension and diabetes.



SOCIAL HISTORY:  

The patient reports an active 55-pack-year smoking history and social alcohol 
use.  She denies illicit drug abuse.



REVIEW OF SYSTEMS:  

A 12-point review of systems is negative except as per HPI.



PHYSICAL EXAMINATION:

VITAL SIGNS:  Temperature 98.6, pulse 89, blood pressure 130/70, respiratory 
rate 20, oxygen saturation 91% on 3L NC.

GENERAL:  No apparent distress.

HEENT:  PERRL, EOMI.  No scleral icterus.  No conjunctival pallor.

NECK:  No JVD.

LUNGS:  Decreased breath sounds at the bases with bibasilar crackles and faint 
expiratory wheeze.

CARDIOVASCULAR:  Regular rate and rhythm.  Normal S1, S2.

ABDOMEN:  Normoactive bowel sounds, soft, nontender and nondistended.

EXTREMITIES:  Trace pedal edema.

NEUROLOGIC:  Awake, alert and oriented x 3.  No focal motor deficits.



LABORATORY DATA:  

WBC 8.5, hemoglobin 11, hematocrit 33, platelets 318. 

Sodium 132, potassium 4.2, chloride 99, bicarb 28, BUN 12, creatinine 0.7, 
glucose 267. 

Influenza serology negative.



IMAGING STUDIES:  

1.  Chest x-ray demonstrates infiltrate to the right lung base with prominent 
interstitial markings.



ASSESSMENT:  

The patient is a 68-year-old woman with multiple medical comorbidities including
severe COPD with active tobacco dependence who presented for evaluation of a 5 
day history of chest tightness, wheeze, dyspnea and cough productive of green 
sputum and was admitted for management of community-acquired pneumonia.



PLAN:

1.  Community-acquired pneumonia.  Continue supplemental oxygen, bronchodilators
and steroid taper.  Continue Levaquin 500 mg IV daily.  Continue to monitor for 
fever and leukocytosis and await culture reports.

2.  Severe COPD.  Continue with supplemental oxygen, bronchodilators and steroid
taper.

3.  Insulin-dependent diabetes mellitus.  Resume the patient's home medications 
and continue to monitor fingersticks at a.c. and at bedtime.

4.  CAD s/p PCI with stent placement.  Continue Aspirin 325 mg p.o. daily, 
Lipitor 40 mg p.o. daily and Toprol XL 50 mg p.o. daily.

5.  Hypertension.  Blood pressure controlled.  Continue Toprol XL 50 mg p.o. 
daily and Norvasc 5 mg p.o. daily.

6.  Hyperlipidemia.  Continue Lipitor 40 mg p.o. daily.

7.  Anxiety disorder.  Continue Xanax 1 mg p.o. q. 6 hours p.r.n anxiety.

8.  Sciatica.  Continue Gabapentin 300 mg p.o. t.i.d.

9.  Prophylaxis.  Continue Protonix for GI prophylaxis.  DVT prophylaxis not 
indicated as the patient is ambulatory.





CODE STATUS:  Full code.





__________________________________________

Christopher Philip MD





DD:  03/26/2019 9:08:43

DT:  03/26/2019 9:11:26

[facility] # [id number]



RENATA

## 2019-03-26 NOTE — CARD
--------------- APPROVED REPORT --------------





Date of service: 03/26/2019



EKG Measurement

Heart [id number]

AK [id number]

[id number]

[id number]

[id number]



<Conclusion>

Normal sinus rhythm

Left bundle branch block

Abnormal ECG

## 2019-03-26 NOTE — ED PDOC
Arrival/HPI





- General


Chief Complaint: Shortness Of Breath


Time Seen by Provider: 19 02:05


Historian: Patient





- History of Present Illness


Narrative History of Present Illness (Text): 





19 02:57


68 year old female, whose past medical history includes COPD and CAD with 

stents, presents to the emergency department complaining of shortness of breath 

for 3 days.  Patient states she has seen her PMD who had placed her on Levaquin 

and a cough expectorant.  Patient states she has not had any relief.  Patient 

states she has difficulty sleeping at night because of her breathing.  Patient 

is a smoker bust states she has not been smoking during this period.  Patient 

denies any fever, chest pain, abdominal pain, nausea, vomiting, or any other 

complaints.  





Time/Duration: < week (3 days)


Symptom Onset: Gradual


Symptom Course: Unchanged


Activities at Onset: Light


Context: Home





Past Medical History





- Provider Review


Nursing Documentation Reviewed: Yes





- Past History


Past History: No Previous





- Infectious Disease


Hx of Infectious Diseases: None





- Tetanus Immunization


Tetanus Immunization: Unknown





- Cardiac


Hx Pacemaker: No


Other/Comment: cardiac stent





- Pulmonary


Hx Chronic Obstructive Pulmonary Disease (COPD): Yes





- Neurological


Hx Paralysis: No





- HEENT


Hx HEENT Disorder: No





- Renal


Hx Renal Disorder: No





- Endocrine/Metabolic


Hx Diabetes Mellitus Type 2: Yes





- Hematological/Oncological


Hx Blood Transfusions: No





- Integumentary


Hx Dermatological Disorder: Yes


Hx Psoriasis: Yes





- Musculoskeletal/Rheumatological


Hx Musculoskeletal Disorders: Yes (LUMBAR RADICULOPATHY,L ULNAR NERVE ENTRAPMENT

,DECOMPRESSION)





- Gastrointestinal


Hx Gastrointestinal Disorders: Yes


Hx Gastroesophageal Reflux: Yes





- Genitourinary/Gynecological


Hx Genitourinary Disorders: Yes (1  1 MISCARRIAGE)


Other/Comment: OOPHORECTOMY





- Psychiatric


Hx Panic Disorder: Yes


Hx Substance Use: No





- Surgical History


Hx Cardiac Catheterization: Yes (2008)


Hx Coronary Stent: Yes (X2)


Other/Comment: trigger finger and ulna sx





- Anesthesia


Hx Anesthesia Reactions: No


Hx Malignant Hyperthermia: No





- Suicidal Assessment


Feels Threatened In Home Enviroment: No





Family/Social History





- Physician Review


Nursing Documentation Reviewed: Yes


Family/Social History: No Known Family HX


Smoking Status: Current Some Days Smoker


Hx Alcohol Use: No


Hx Substance Use: No


Hx Substance Use Treatment: No





Allergies/Home Meds


Allergies/Adverse Reactions: 


Allergies





bacitracin Allergy (Verified 19 02:06)


   SWELLING


latex Allergy (Verified 19 02:06)


   RASH


Penicillins Allergy (Verified 19 02:06)


   ANAPHYLAXIS








Home Medications: 


                                    Home Meds











 Medication  Instructions  Recorded  Confirmed


 


Atorvastatin Calcium [Lipitor] 40 mg PO DAILY 13


 


QUEtiapine [Seroquel] 50 mg PO HS 14


 


Alprazolam [Xanax] 1 mg PO QID 07/25/15 03/26/19


 


Aspirin [Ecotrin] 325 mg PO DAILY 09/26/15 03/26/19


 


Esomeprazole Magnesium [Nexium] 40 mg PO DAILY 16


 


Metoprolol Succinate XL [Toprol XL] 50 mg PO DAILY 10/06/16 03/26/19


 


Albuterol Sulfate [Proair Hfa] 1 inh INH PRN PRN 17


 


Insulin Lispro [humALOG] 5 units SC AC 17


 


Desvenlafaxine Succinate [Pristiq] 50 mg PO DAILY 18


 


Fluticasone Furoate [Arnuity 1 puff INH DAILY 18





Ellipta]   


 


Insulin Glargine, Recombina 15 units SC HS 18





[Lantus]   


 


Umeclidinium Brm/Vilanterol Tr 1 puff INH DAILY 18





[Anoro Ellipta 62.5-25 Mcg INH]   


 


amLODIPine [Norvasc] 5 mg PO DAILY 18














Review of Systems





- Physician Review


All systems were reviewed & negative as marked: Yes





- Review of Systems


Constitutional: absent: Fevers


Respiratory: SOB


Cardiovascular: absent: Chest Pain


Gastrointestinal: absent: Abdominal Pain, Nausea, Vomiting





Physical Exam


Vital Signs Reviewed: Yes





Vital Signs











  Temp Pulse Resp BP Pulse Ox


 


 19 02:40      99


 


 19 02:17    28 H   94 L


 


 19 02:11      94 L


 


 19 02:03  98.4 F  86  28 H  108/60  85 L











Temperature: Afebrile


Blood Pressure: Normal


Pulse: Regular


Respiratory Rate: Tachypneic


Appearance: Positive for: Well-Appearing, Non-Toxic, Comfortable


Pain Distress: None


Mental Status: Positive for: Alert and Oriented X 3





- Systems Exam


Head: Present: Atraumatic, Normocephalic


Pupils: Present: PERRL


Extroacular Muscles: Present: EOMI


Conjunctiva: Present: Normal


Mouth: Present: Moist Mucous Membranes


Neck: Present: Normal Range of Motion


Respiratory/Chest: Present: Wheezes, Decreased Breath Sounds


Cardiovascular: Present: Regular Rate and Rhythm, Normal S1, S2.  No: Murmurs


Abdomen: No: Tenderness, Distention, Peritoneal Signs


Back: Present: Normal Inspection


Upper Extremity: Present: Normal Inspection.  No: Cyanosis, Edema


Lower Extremity: Present: Normal Inspection.  No: Edema


Neurological: Present: GCS=15, CN II-XII Intact, Speech Normal


Skin: Present: Warm, Dry, Normal Color.  No: Rashes


Psychiatric: Present: Alert, Oriented x 3, Normal Insight, Normal Concentration





Medical Decision Making


ED Course and Treatment: 





19 03:03


Impression: 68 year old female presents with shortness of breath/ COPD 

exacerbation.





Plan:


-- EKG


-- Cardiac Iso, CK, CMP, BNP


-- Chest X-ray


-- Duoneb


-- Solumedrol


-- Reassess and disposition





Prior Visits:


Notes and results from previous visits were reviewed. 





Progress Notes:


EKG Reviewed by me, shows:


Normal sinus rhythm @ 80bpm


LBBB


Nonspecific STT wave changes


Unchanged from previous on 3/16/18





03/26/19 03:59


Chest, single view.


Indication: Shortness of breath.


Comparison: 3/16/2018.


Findings:


There is interval appearance of bilateral lower lobes airspace infiltrates.


The lungs are expanded.  There is no demonstrated parenchymal abnormality.  

There is no demonstrated pleural abnormality.


Normal heart and pericardium.   


Normal mediastinum and emperatriz. Normal visualized pulmonary arteries. Normal 

visualized aortic arch and descending thoracic aorta. 


Normal visualized thoracic spine. Normal visualized ribs, clavicles, and 

shoulders.


There is no demonstrated abnormality of the visualized soft tissue structures of

 the upper abdomen.


Impression:


Interval appearance of bilateral lower lobes airspace infiltrates.





19 04:27


Case discussed with Dr Christopher Philip who accepts patient to his service.














- Lab Interpretations


Lab Results: 





                                        











PT  12.0 SECONDS (9.4-12.5)   19  02:26    


 


INR  1.08   19  02:26    


 


APTT  35.4 Seconds (26.9-38.3)   19  02:26    








                                        











Total Bilirubin  0.2 mg/dL (0.2-1.3)   19  02:26    


 


AST  25 U/L (14-36)   19  02:26    


 


ALT  24 U/L (7-56)   19  02:26    


 


Alkaline Phosphatase  160 U/L ()  H D 19  02:26    


 


Total Protein  6.2 g/dL (5.8-8.3)   19  02:26    


 


Albumin  3.3 g/dL (3.0-4.8)   19  02:26    


 


Globulin  2.9 gm/dL  19  02:26    


 


Albumin/Globulin Ratio  1.2  (1.1-1.8)   19  02:26    














- RAD Interpretation


Radiology Orders: 











19 02:15


CHEST PORTABLE [RAD] Stat 














- Medication Orders


Current Medication Orders: 














Discontinued Medications





Albuterol/Ipratropium (Duoneb 3 Mg/0.5 Mg (3 Ml) Ud)  3 ml IH ONCE STA


   Stop: 19 02:16


   Last Admin: 19 02:30  Dose: 3 ml





Methylprednisolone (Solu-Medrol)  125 mg IVP ONCE ONE


   Stop: 19 02:16


   Last Admin: 19 02:30  Dose: 125 mg





IVP Administration


 Document     19 02:30  KV  (Rec: 19 02:36  KV  [id number])


     Charges for Administration


      # of IVP Administrations                   1














- Scribe Statement


The provider has reviewed the documentation as recorded by the Georgiaibyusef Smith





Provider Scribe Attestation:


All medical record entries made by the Scribe were at my direction and 

personally dictated by me. I have reviewed the chart and agree that the record 

accurately reflects my personal performance of the history, physical exam, 

medical decision making, and the department course for this patient. I have also

 personally directed, reviewed, and agree with the discharge instructions and 

disposition.











Disposition/Present on Arrival





- Present on Arrival


Any Indicators Present on Arrival: No


History of DVT/PE: No


History of Uncontrolled Diabetes: Yes


Urinary Catheter: No


History of Decub. Ulcer: No


History Surgical Site Infection Following: None





- Disposition


Have Diagnosis and Disposition been Completed?: Yes


Diagnosis: 


 COPD exacerbation, Cor pulmonale, Pneumonia





Disposition: HOSPITALIZED


Disposition Time: 04:29


Patient Plan: Observation


Patient Problems: 


                             Current Active Problems











Problem Status Onset


 


COPD exacerbation Acute 


 


Cor pulmonale Acute 


 


Pneumonia Acute 











Condition: STABLE

## 2019-03-26 NOTE — RAD
Date of service: 



03/26/2019



HISTORY:

 sob 



COMPARISON:

12/27/2018 



TECHNIQUE:

1 view obtained.



FINDINGS:



LUNGS:

Infiltrate at the right lung base.  Prominent interstitial markings.



PLEURA:

No significant pleural effusion identified, no pneumothorax apparent.



CARDIOVASCULAR:

Aortic calcification



Normal cardiac size. No pulmonary vascular congestion. 



OSSEOUS STRUCTURES:

No significant abnormalities.



VISUALIZED UPPER ABDOMEN:

Normal.



OTHER FINDINGS:

None.



IMPRESSION:

Infiltrate at the right lung base.  Prominent interstitial markings.

## 2019-03-28 NOTE — PN
SUBJECTIVE:  

The patient was seen and examined at bedside on the general medical gee.  No 
acute events overnight.  She remains afebrile, hemodynamically stable and with 
continued improvement in her respiratory status.  This morning she feels okay 
and offers no complaints.



OBJECTIVE:

VITAL SIGNS:  T 97.5, P 79, /57, RR 20, O2 saturation 98% on 3L NC.

GENERAL:  No apparent distress.

HEENT:  PERRL, EOMI.  No scleral icterus.  No conjunctival pallor.

NECK:  No JVD.

LUNGS:  Decreased breath sounds at bases with faint expiratory wheeze.

CARDIOVASCULAR:  Regular rate and rhythm.  Normal S1, S2.

ABDOMEN:  Normoactive bowel sounds, soft, nontender, nondistended.

EXTREMITIES:  No edema.

NEUROLOGIC:  Awake, alert and oriented x 3.  No focal motor deficits.



LABORATORY DATA:  

WBC 12.2 with 93% neutrophils, hemoglobin 10.6, hematocrit 31, platelets 341.  

Sodium 132, potassium 5, chloride 96, bicarb 30, BUN 22, creatinine 0.7, glucose
447.  

Blood cultures with no growth to date.



ASSESSMENT: 

The patient is a 68-year-old woman with multiple medical comorbidities including
severe COPD with active tobacco dependence who presented for evaluation of a 5 
day history of chest tightness, wheeze, dyspnea and cough productive of green 
sputum and was admitted for management of community-acquired pneumonia.



PLAN:

1.  Community-acquired pneumonia, improving.  Continue supplemental oxygen, 
bronchodilators and steroid taper.  Continue Levaquin 500 mg IV daily.  Continue
to monitor for fever, leukocytosis and await culture reports.

2.  Severe COPD.  Continue with supplemental oxygen, bronchodilators and steroid
taper.

3.  Insulin-dependent diabetes mellitus.  Fingersticks remain elevated secondary
to IV steroids.  We will continue adjust her insulin regimen as needed.

4.  CAD s/p PCI with stent placement.  Continue Aspirin 325 mg p.o. daily, 
Lipitor 40 mg p.o. daily and Toprol XL 50 mg p.o. daily.

5.  Hypertension.  Continue Toprol XL 50 mg p.o. daily and Norvasc 5 mg p.o. 
daily.

6.  Hyperlipidemia.  Continue Lipitor 40 mg p.o. daily.

7.  Anxiety disorder.  Continue Xanax 1 mg p.o. q. 6 hours p.r.n. anxiety.

8.  Sciatica.  Continue Gabapentin 300 mg p.o. t.i.d.

9.  Prophylaxis.  Continue Protonix for GI prophylaxis.  DVT prophylaxis not 
indicated as the patient is ambulatory.





CODE STATUS:  Full code.





__________________________________________

Christopher Philip MD





DD:  03/28/2019 8:55:05

DT:  03/28/2019 8:56:26

Job # [id number]



MTDBRET

## 2019-03-28 NOTE — CP.PCM.APN
Subjective





- Date & Time of Evaluation


Date of Evaluation: 03/28/19


Time of Evaluation: 12:45





- Subjective


Subjective: 





Pt. seen and examined, sitting up in bed, signif other at bedside.  States just 

became short of breath after ambulating with PT, inquiring about home oxygen.  

States breathing at rest is better, c/o cough, denied chest pain, palpitations, 

any other complaints.





Objective





- Vital Signs/Intake and Output


Vital Signs (last 24 hours): 


                                        











Temp Pulse Resp BP Pulse Ox


 


 97.5 F L  95 H  20   108/71   94 L


 


 03/27/19 18:00  03/28/19 10:03  03/27/19 18:00  03/28/19 10:03  03/27/19 05:34








Intake and Output: 


                                        











 03/28/19 03/28/19





 06:59 18:59


 


Intake Total 720 


 


Balance 720 














- Medications


Medications: 


                               Current Medications





Albuterol/Ipratropium (Duoneb 3 Mg/0.5 Mg (3 Ml) Ud)  3 ml IH [id number] [facility]


   Last Admin: 03/28/19 11:15 Dose:  3 ml


Albuterol/Ipratropium (Duoneb 3 Mg/0.5 Mg (3 Ml) Ud)  3 ml IH Q2H PRN


   PRN Reason: Shortness of Breath


Alprazolam (Xanax)  1 mg PO QID PRN; Protocol


   PRN Reason: Anxiety


   Last Admin: 03/27/19 21:02 Dose:  1 mg


Amlodipine Besylate (Norvasc)  5 mg PO DAILY [facility]


   Last Admin: 03/28/19 10:02 Dose:  5 mg


Arformoterol Tartrate (Brovana)  15 mcg IH [id number] [facility]


   Last Admin: 03/28/19 07:33 Dose:  15 mcg


Aspirin (Ecotrin)  325 mg PO DAILY [facility]


   Last Admin: 03/28/19 09:59 Dose:  325 mg


Atorvastatin Calcium (Lipitor)  40 mg PO DIN [facility]


   Last Admin: 03/27/19 17:32 Dose:  40 mg


Budesonide (Pulmicort Respules)  0.5 mg IH [id number] [facility]


   Last Admin: 03/28/19 07:34 Dose:  0.5 mg


Furosemide (Lasix)  20 mg PO DAILY [facility]


   Last Admin: 03/28/19 10:00 Dose:  20 mg


Gabapentin (Neurontin)  300 mg PO TID [facility]; Protocol


   Last Admin: 03/28/19 10:01 Dose:  300 mg


Guaifenesin/Codeine Phosphate (Robitussin W/Codeine)  5 ml PO Q4H PRN


   PRN Reason: Cough and congestion


   Last Admin: 03/28/19 11:42 Dose:  5 ml


Levofloxacin/Dextrose (Levaquin 500mg)  500 mg in 100 mls @ 100 mls/hr IVPB 

DAILY [facility]; Protocol


   Last Admin: 03/28/19 10:00 Dose:  100 mls/hr


Insulin Detemir (Levemir)  10 unit SC QAM [facility]


   Last Admin: 03/28/19 10:01 Dose:  10 units


Insulin Detemir (Levemir)  12 unit SC HS [facility]


   Last Admin: 03/27/19 22:08 Dose:  12 unit


Insulin Human Lispro (Humalog High)  0 units SC ACHS [facility]; Protocol


   Last Admin: 03/28/19 11:48 Dose:  12 units


Methylprednisolone (Solu-Medrol)  30 mg IVP DAILY [facility]


   Last Admin: 03/28/19 10:02 Dose:  30 mg


Metoprolol Succinate (Toprol Xl)  50 mg PO BRK [facility]


   Last Admin: 03/28/19 10:03 Dose:  50 mg


Nicotine (Nicoderm Cq)  1 patch TD DAILY [facility]


   Last Admin: 03/28/19 10:01 Dose:  1 patch


Ondansetron HCl (Zofran Tab)  4 mg PO Q8H PRN


   PRN Reason: Nausea/Vomiting


   Last Admin: 03/27/19 21:20 Dose:  4 mg


Pantoprazole Sodium (Protonix Ec Tab)  40 mg PO 0600 [facility]


   Last Admin: 03/28/19 05:53 Dose:  40 mg


Quetiapine Fumarate (Seroquel)  50 mg PO HS [facility]; Protocol


   Last Admin: 03/27/19 21:02 Dose:  50 mg


Throat Lozenges (Cepastat)  1 jason MT Q6H PRN


   PRN Reason: Sore Throat











- Labs


Labs: 


                                        





                                 03/28/19 06:00 





                                 03/28/19 06:00 





                                        











PT  12.0 SECONDS (9.4-12.5)   03/26/19  02:26    


 


INR  1.08   03/26/19  02:26    


 


APTT  35.4 Seconds (26.9-38.3)   03/26/19  02:26    














- Constitutional


Appears: Well, Non-toxic





- Head Exam


Head Exam: NORMOCEPHALIC





- Eye Exam


Eye Exam: Normal appearance


Pupil Exam: NORMAL ACCOMODATION





- ENT Exam


ENT Exam: Mucous Membranes Moist, Normal Exam





- Neck Exam


Neck Exam: Full ROM





- Respiratory Exam


Respiratory Exam: Decreased Breath Sounds





- Cardiovascular Exam


Cardiovascular Exam: REGULAR RHYTHM, +S1, +S2





- GI/Abdominal Exam


GI & Abdominal Exam: Soft





- Rectal Exam


Rectal Exam: Deferred





-  Exam


 Exam: absent: Circumcision, NORMAL INSPECTION, Scrotal Swelling, Testicular 

Tenderness, Uretheral Discharge, Testicular Vertical Lie, Bladder Distension


External exam: absent: Ecchymosis, Erythema, Lacerations, Lesions, NORMAL 

EXTERNAL EXAM, Swelling


Bimanual exam: absent: Adenexal Mass, Adnexal, Cervical Motion Tendernes, NORMAL

BIMANUAL EXAM, Uterine Enlargement, Uterine Tenderness





- Extremities Exam


Extremities Exam: Full ROM





- Back Exam


Back Exam: NORMAL INSPECTION





- Neurological Exam


Neurological Exam: Oriented x3





- Psychiatric Exam


Psychiatric exam: Normal Affect, Normal Mood





- Skin


Skin Exam: Dry, Intact, Normal Color, Warm





Assessment and Plan





- Assessment and Plan (Free Text)


Assessment: 





ITS Impressions





Chest X-Ray  03/26/19 02:15


IMPRESSION:


Infiltrate at the right lung base.  Prominent interstitial markings. 


 


 





Assessment:





68 year old female, whose past medical history includes COPD and CAD with 

stents, presents to the emergency department complaining of shortness of breath 

for 3 days,states she has seen her PMD who had placed her on Levaquin and a 

cough expectorant, has failed outpatient treatment and now admitted with 

pneumonia , copd for further eval and treatment. 





Plan:





1.  Pneumonia right lower lobe


   Continue IV Antibiotics, monitor/trend Wbc





2.  COPD exac,


   dylon IV steroid weaning, Duonebs, inhalers


   monitor resp status.





Will continue to monitor clinical status and follow closely

## 2019-03-29 NOTE — CP.PCM.PCO
Additional Comments





- Additional Comments


Additional Comments: 





Pt. seen this am , sitting up in bed, no dyspnea noted, no signif. wheezing 

noted. still with cough.


Solumedrol tapered down to 30 mg daily.


PT rec. TCU, accepted per Harley Perez to comment on patient's Seroquel, continued from home prior to admisson, 

for dC to Tcu tommorow.


Discussed with PMD,HOMERO/BRISSA.

## 2019-03-29 NOTE — PN
DATE:  03/29/2019



SUBJECTIVE:  She is currently in room 378, bed two.  She has no complaints

and there have been no acute events overnight.



PHYSICAL EXAMINATION:

VITAL SIGNS:  Temperature of 98.4, pulse rate of 78, blood pressure 166/69,

respiratory rate of 18 with an O2 saturation of 94% on room air.

HEENT:  Unremarkable.

NECK:  Supple with full range of motion.

LUNGS:  Clear to auscultation and percussion bilaterally.

HEART:  Shows a regular rate and rhythm with no murmurs.

ABDOMEN:  Benign.

EXTREMITIES:  Show no deformities or edema.

NEUROLOGIC:  There are no focal motor deficits.



LABORATORY DATA:  Lab values are WBC of 13.6, hemoglobin and hematocrit

11.1 and 34.7.  Sodium 131, chloride of 94, glucose of 340.  This is due of

a steroid effect.  AST and ALT at 37 and 32 respectively.



ASSESSMENT AND PLAN:  The patient is being tapered off steroids.  We will

continue current medication.  The impression at this time is exacerbation

of chronic obstructive pulmonary disease, cor pulmonale, pneumonia,

diabetes.







__________________________________________

Edis Philip MD





DD:  03/29/2019 9:33:36

DT:  03/29/2019 9:35:31

Job # [id number]

## 2019-03-29 NOTE — CP.PCM.PCO
Physician Communication Note





- Physician Communication Note


Physician Communication Note: pt is on seroquel since 2014, for now continue, pt

could be transferred 





Addendum


Addendum: 





03/29/19 16:17


as per record pt is on seroquel since 2014, for now continue, pt could be 

transferred to TCU


Dr.Rubin javed f/u over the weekend.

## 2019-03-30 NOTE — PN
SUBJECTIVE:

The patient was seen and examined at bedside on the general medical gee.  No 
acute events overnight.  She remains afebrile, hemodynamically stable and with 
near resolution of her respiratory symptoms.  She is pending transfer to TCU for
continued strengthening.



OBJECTIVE:

VITAL SIGNS:  Temperature 98.1, pulse 83, blood pressure 126/65, respiratory 
rate 20, oxygen saturation 96% on room air.

GENERAL:  No apparent distress.

HEENT:  PERRL, EOMI.  No scleral icterus.  No conjunctival pallor.

NECK:  No JVD.

LUNGS:  Clear to auscultation.

CARDIOVASCULAR:  Regular rate and rhythm.  Normal S1, S2.

ABDOMEN:  Normoactive bowel sounds, soft, nontender, nondistended.

EXTREMITIES:  No edema.

NEUROLOGIC:  Awake, alert and oriented x 3.  No focal motor deficits.



LABORATORY DATA:  

WBC 10.8 with 57% neutrophils, hemoglobin 12, hematocrit 37, platelets 396.  

Sodium 132, potassium 4.2, chloride 97, bicarb 30, BUN 26, creatinine 0.7, 
glucose 334.  

Blood cultures negative.



ASSESSMENT:  

The patient is a 68-year-old woman with multiple medical comorbidities including
severe COPD with active tobacco dependence who presented for evaluation of a 5 
day history of chest tightness, wheeze, dyspnea and cough productive of green 
sputum and was admitted for management of community-acquired pneumonia.



PLAN:

1.  Community-acquired pneumonia, resolving.  Continue supplemental oxygen and 
bronchodilators.  We will discontinue steroids.  Continue Levaquin 500 mg p.o 
daily to complete a 10-day course.  Continue to monitor for fever and 
leukocytosis and await final culture reports.

2.  Severe COPD.  Continue supplemental oxygen and bronchodilators.

3.  Insulin-dependent diabetes mellitus.  Fingersticks remain elevated secondary
to IV steroids which will be discontinued today.  We will continue to monitor 
fingersticks and adjust glycemic control as needed.

4.  CAD s/p PCI with stent placement.  Continue Aspirin 325 mg p.o. daily, 
Lipitor 40 mg p.o. daily and Toprol XL 50 mg p.o daily.

5.  Hypertension.  Continue Toprol XL 50 mg p.o daily and Norvasc 5 mg p.o 
daily.

6.  Hyperlipidemia.  Continue Lipitor 40 mg p.o daily.

7.  Anxiety disorder.  Continue Xanax 1 mg p.o q. 6 hours as needed for anxiety.

8.  Sciatica.  Continue Gabapentin 300 mg p.o. t.i.d.

9.  Prophylaxis.  Continue Protonix for GI prophylaxis.  DVT prophylaxis is not 
indicated as the patient is ambulatory.





Code status full code.





__________________________________________

Christopher Philip MD

 



DD:  03/30/2019 9:00:37

DT:  03/30/2019 9:02:36

Job # [id number]



RENATA

## 2019-03-30 NOTE — CP.PCM.PN
Subjective





- Date & Time of Evaluation


Date of Evaluation: 03/30/19


Time of Evaluation: 21:55





- Subjective


Subjective: 





Patient was seen at bedside.


I was asked to co-sign order for Seroquel.


She states that they can not stop her Seroquel just like that.


States that she can not sleep without Seroquel.


She has no other complaints.


Medical record was reviewed.


This 68 year old woman was admitted to [facility] for pneumonia.


Has PMH of CAD,S/P PCI,HTN,HLD,IDDM,anxiety,sciatica.





Objective





- Vital Signs/Intake and Output


Vital Signs (last 24 hours): 


                                        











Temp Pulse Resp BP Pulse Ox


 


 97.4 F L  85   18   123/68   94 L


 


 03/30/19 16:00  03/30/19 16:00  03/30/19 17:48  03/30/19 16:00  03/30/19 16:00








Intake and Output: 


                                        











 03/30/19 03/31/19





 18:59 06:59


 


Intake Total  340


 


Balance  340














- Medications


Medications: 


                               Current Medications





Acetaminophen (Tylenol 325mg Tab)  650 mg PO Q6H PRN; Protocol


   PRN Reason: Pain, Mild (1-3)


Al Hydrox/Mg Hydrox/Simethicone (Maalox Plus 30 Ml)  30 ml PO QID PRN; Protocol


   PRN Reason: Indigestion / Heartburn


Albuterol/Ipratropium (Duoneb 3 Mg/0.5 Mg (3 Ml) Ud)  3 ml IH [id number] [facility]; 

Protocol


Albuterol/Ipratropium (Duoneb 3 Mg/0.5 Mg (3 Ml) Ud)  3 ml IH Q2H PRN; Protocol


   PRN Reason: Shortness of Breath


Alprazolam (Xanax)  1 mg PO QID PRN; Protocol


   PRN Reason: Anxiety


   Last Admin: 03/30/19 21:45 Dose:  1 mg


Amlodipine Besylate (Norvasc)  5 mg PO DAILY [facility]; Protocol


Arformoterol Tartrate (Brovana)  15 mcg IH [id number] [facility]; Protocol


Aspirin (Ecotrin)  325 mg PO 0800 [facility]; Protocol


Atorvastatin Calcium (Lipitor)  40 mg PO DIN [facility]; Protocol


   Last Admin: 03/30/19 17:42 Dose:  40 mg


Budesonide (Pulmicort Respules)  0.5 mg IH [id number] [facility]; Protocol


Furosemide (Lasix)  20 mg PO DAILY [facility]; Protocol


Gabapentin (Neurontin)  300 mg PO TID [facility]; Protocol


   Last Admin: 03/30/19 17:42 Dose:  300 mg


Guaifenesin/Codeine Phosphate (Robitussin W/Codeine)  5 ml PO Q4H PRN; Protocol


   PRN Reason: Cough and congestion


Insulin Detemir (Levemir)  15 unit SC ACB [facility]; Protocol


Insulin Detemir (Levemir)  15 unit SC HS [facility]; Protocol


   Last Admin: 03/30/19 21:45 Dose:  15 units


Insulin Human Lispro (Humalog High)  0 units SC ACHS [facility]; Protocol


   Last Admin: 03/30/19 21:31 Dose:  Not Given


Levofloxacin (Levaquin)  500 mg PO DAILY [facility]; Protocol


Metoprolol Succinate (Toprol Xl)  50 mg PO BRK [facility]; Protocol


Nicotine (Nicoderm Cq)  1 patch TD DAILY [facility]; Protocol


Ondansetron HCl (Zofran Tab)  4 mg PO Q8H PRN; Protocol


   PRN Reason: Nausea/Vomiting


   Last Admin: 03/30/19 18:06 Dose:  4 mg


Pantoprazole Sodium (Protonix Ec Tab)  40 mg PO 0600 [facility]; Protocol


Throat Lozenges (Cepastat)  1 jason MT Q6H PRN; Protocol


   PRN Reason: Sore Throat











- Constitutional


Appears: Well, No Acute Distress





- Head Exam


Head Exam: ATRAUMATIC, NORMAL INSPECTION, NORMOCEPHALIC





- Eye Exam


Eye Exam: Normal appearance





- ENT Exam


ENT Exam: Normal External Ear Exam





- Neck Exam


Neck Exam: Normal Inspection





- Respiratory Exam


Respiratory Exam: NORMAL BREATHING PATTERN





- Cardiovascular Exam


Cardiovascular Exam: absent: JVD





- GI/Abdominal Exam


GI & Abdominal Exam: absent: Distended





- Rectal Exam


Rectal Exam: Deferred





-  Exam


Additional comments: 





Deferred.





- Extremities Exam


Extremities Exam: Normal Inspection





- Back Exam


Back Exam: NORMAL INSPECTION





- Neurological Exam


Neurological Exam: Alert, Awake, Oriented x3





- Psychiatric Exam


Psychiatric exam: Normal Affect, Normal Mood





- Skin


Skin Exam: Normal Color





Assessment and Plan





- Assessment and Plan (Free Text)


Assessment: 





Insomnia.


Anxiety.


CAD


IDDM


HTN


HLD


PNA.


Plan: 





Seroquel 25 mg PO x 1.


Continue present management.

## 2019-04-01 NOTE — CON
DATE:  03/30/2019



HISTORY OF PRESENT ILLNESS:  The patient is a 68-year-old 

 female with a history of anxiety as well as depression.  No

psychiatric admissions or suicide attempts, who is currently being

prescribed Xanax and Seroquel by her primary care doctor though in prior

treatment with  _____ years ago, who is being consulted on the medical

floor as she is being planned to [facility].  Psychiatry was consulted regarding

the patient's current prescription of 50 mg of Seroquel at night.  I met

with the patient at bedside and the patient is calm, cooperative, and well

oriented to current month, year, location, and circumstances.  She reports

that she does have bouts of anxiety and she feels that the Xanax that she

is being prescribed at 1 mg q.i.d. has been very beneficial for panic

symptoms and generally she feels that she is able to function when she is

able to take this medication.  The patient has also been prescribed

Seroquel for many years and she would like to streamline her medications

and in this respect, she has been decreasing her Seroquel to 25 mg as

recommended by her primary care doctor and she has no issues with

discontinuing this medication altogether.  She does not appear to have any

diagnosis of psychosis or incidents of psychosis and likely will not

decompensate in this respect; however, she might have issues _____ however,

Psychiatry will follow up with her to determine her level of functioning. 

Her insight and judgement are considered to be fair.



PSYCHIATRIC HISTORY:  The patient denies any suicidal attempts or

psychiatric hospitalizations.  She has been seen by  _____ and was

prescribed Xanax and Seroquel.  Currently now prescribed Xanax and Seroquel

by her primary care doctor, Xanax is prescribed at 1 mg q.i.d. and Seroquel

50 mg at bedtime.



SOCIAL HISTORY:  The patient was born in [place] and currently lives in [place].   twice, one for 11 years and other was only for 4 months. 

She has two sons.  She lives with a caregiver for about 20 years.  She has

a supportive relationship with an individual.



Labs and vitals were reviewed.



MEDICATIONS:  Relevant psychiatric medications include Xanax 1 mg p.o.

q.i.d. p.r.n. and Seroquel 50 mg at bedtime scheduled.



IMPRESSION:  Anxiety disorder, not otherwise specified, likely complaints

of panic and adjustment disorder with anxiety, rule out generalized anxiety

disorder.  The patient denies being depressed at this time.



RECOMMENDATIONS:  We will discontinue Seroquel at this time and continue

with Xanax.  The patient is in full agreement.  We will follow up with the

patient in few days to determine her functioning status post

discontinuation, but  overall she should tolerate this well.







__________________________________________

Afshin Nguyen MD





DD:  03/30/2019 10:39:32

DT:  03/30/2019 14:46:52

Job # [id number]

## 2019-04-01 NOTE — PN
SUBJECTIVE:  

The patient was seen and examined at bedside on the TCU.  No acute events 
overnight.  She remains afebrile and hemodynamically stable.



OBJECTIVE:

VITAL SIGNS:  Temperature 97.9, pulse 80, blood pressure 100/60, respiratory 
rate 20, and oxygen saturation 95% on room air.

GENERAL:  No apparent distress.

HEENT:  PERRL, EOMI.  No scleral icterus.  No conjunctival pallor.

NECK:  No JVD.

LUNGS:  Clear to auscultation.

CARDIOVASCULAR:  Regular rate and rhythm.  Normal S1 and S2.

ABDOMEN:  Normoactive bowel sounds, soft, nontender, and nondistended.

EXTREMITIES:  No edema.

NEUROLOGIC:  Awake, alert, and oriented x 3.  No focal motor deficits.



LABORATORY DATA:  

No new labs.



ASSESSMENT:  

The patient is a 68-year-old woman with multiple medical comorbidities including
severe COPD with active tobacco dependence who was initially admitted for 
management of community-acquired pneumonia and was subsequently transferred to 
the TCU for continued PT.



PLAN:

1.  Community-acquired pneumonia, resolving.  Continue Levaquin 500 mg p.o. 
daily to complete a 10 day course (today day #6 of 10).

2.  Severe COPD.  Continue with supplemental oxygen, bronchodilators.

3.  Insulin-dependent diabetes mellitus.  Continue to monitor fingersticks 
q.a.c. and at bedtime.  Continue with current insulin regimen.

4.  CAD s/p PCI with stent placement.  Continue Aspirin 325 mg p.o. daily, 
Lipitor 40 mg p.o. daily and Toprol XL 50 mg p.o. daily.

5.  Hypertension.  Continue Toprol XL 50 mg p.o. daily and Norvasc 5 mg p.o. 
daily.

6.  Hyperlipidemia.  Continue Lipitor 40 mg p.o. daily.

7.  Anxiety disorder.  Continue Xanax 1 mg p.o. q. 6 hours as needed for 
anxiety.

8.  Sciatica.  Continue Gabapentin 300 mg p.o. t.i.d.

9.  Prophylaxis.  Continue Protonix for GI prophylaxis.  DVT prophylaxis not 
indicated as the patient is ambulatory.





CODE STATUS:  Full code.





__________________________________________

Christopher Philip MD





DD:  04/01/2019 18:07:56

DT:  04/01/2019 18:09:28

[facility] # [id number]

[email or web address]

## 2019-04-01 NOTE — PN
DATE:  04/01/2019



SUBJECTIVE:  The patient was seen and examined today at the [facility]

[facility] Unit.  Notes reviewed.  The patient initially was seen by Dr. Nguyen

for medication management.  The patient was prescribed Seroquel for many

years and plan was to wean the patient off from the Seroquel.  The patient

was seen today with the medical student.  The patient presented to be alert

and oriented, pleasant and cooperative.  The patient's  was next to

the patient.  The patient wants to be interviewed in front of her . 

As per the patient, she has a history of depression and anxiety, that is

why Seroquel was prescribed to her by Dr. Oh many years ago.  The

patient reported that she stopped seeing Dr. Aleks Oh and her primary

care physician was prescribing her Seroquel.  The patient was not aware

that Seroquel is antipsychotic medication.  After full explaining risks,

benefits and alternatives of the medication, the patient agreed to

decreased doses of Seroquel.  The patient reported that she was taking this

medication for many years and she is afraid of discontinuing medication

abruptly.  Plan was discussed over the weekend.  Seroquel was decreased to

50 mg at the nighttime, yesterday was 25 mg and today it will be 12.5 mg. 

For another couple of days, the patient will continue Seroquel 12.5 mg at

the nighttime.  The patient agreed with that plan.  The patient denied

feeling of hopelessness.  Denied being depressed.  Denied thoughts of

harming herself or others.  Denied any psychotic symptoms.



PHYSICAL EXAMINATION:

VITAL SIGNS:  Reviewed.



MENTAL STATUS EXAM:  The patient presented to be alert and oriented,

pleasant, cooperative, socially appropriate, fair eye contact.  Speech was

underproductive because the patient has COPD exacerbation, sometimes the

patient was short of breath.  Mood described as okay.  Affect was reactive,

mood congruent.  Thought process was coherent and goal directed.  Thought

content, the patient denied visual, auditory, or tactile hallucinations. 

Denied paranoid ideation.  The patient denied thoughts of harming herself

or others.  Denied intent or plan.  Insight and judgment seems to be fair. 

Impulses are well controlled.



LABORATORY DATA:  Reviewed.



MEDICATIONS:  Reviewed.



IMPRESSION:  As per history, anxiety and depression.



PLAN:  Seroquel will be decreased to 12.5 mg today.  Treatment plan was

discussed with the patient and her .  We will follow up and advise

accordingly.















Thank you very much for letting me participate in care of your patient.







__________________________________________

Emily Denson MD





DD:  04/01/2019 17:18:36

DT:  04/01/2019 17:20:31

Job # [id number]

## 2019-04-02 NOTE — DS
ADMISSION DIAGNOSIS:  Community-acquired pneumonia.



DISCHARGE DIAGNOSIS:  Community-acquired pneumonia.



SECONDARY DIAGNOSES:  Chronic obstructive pulmonary disease with active

tobacco dependence, insulin-dependent diabetes mellitus, coronary artery

disease status post percutaneous coronary intervention with stent

placement, hypertension, hyperlipidemia, anxiety disorder and sciatica.



CONSULTATIONS:  None.



IMAGING STUDIES:  Chest x-ray demonstrates infiltrate to the right lung

base.



DIAGNOSTIC STUDIES:  None.



PROCEDURES:  None.



HISTORY OF PRESENT ILLNESS:  The patient is a 60-year-old woman with a past

medical history of severe COPD with active smoking who presented for

evaluation of a 5-day history of chest tightness, dyspnea, wheeze and cough

productive of green sputum.  She was initially seen in her PMD's office 2

days prior with the aforementioned symptoms, she was diagnosed with COPD

exacerbation, started on Levaquin and prednisone.  Two days later she

called her PMD because her symptoms had not improved.  She was advised to

continue her current treatment and as she had no improve over the following

24 hours, to return for reevaluation or to present to the nearest ED.  Over

the following 24 hours, she demonstrated no improvement and thus opted for

ED evaluation.  In the ED, she was afebrile and hemodynamically stable,

however, in moderate respiratory distress.  She received multiple rounds of

bronchodilator treatments without resolution of her wheeze and as such was

admitted for continued management.



HOSPITAL COURSE:  Upon admission to the telemetry gee, she was maintained

on supplemental oxygen and bronchodilators.  Cultures were obtained in the

emergency department and the patient was started on Levaquin 500 mg IV

daily.  Over the following 36 hours, she noted significant improvement in

her respiratory status and was able to ambulate around the telemetry gee,

albeit with mild respiratory distress.  After evaluation with physical

therapy, recommendations were made for transfer to TCU for continued

strengthening to which the patient was agreeable and on hospital day number

4 she was transferred to TCU.



CONDITION:  Good, improved.



DISPOSITION:  TCU.



FOLLOWUP:  The patient will be followed by her PMD while on the TCU.









__________________________________________

Christopher Philip MD





DD:  04/01/2019 14:57:26

DT:  04/01/2019 14:59:22

Job # [id number]

## 2019-04-02 NOTE — PN
SUBJECTIVE:  

The patient was seen and examined at bedside on the TCU.  No acute events 
overnight.  She remains afebrile, hemodynamically stable and with continued 
improvement in her respiratory status.



OBJECTIVE:

VITAL SIGNS:  Temperature 97.8, pulse 90, blood pressure 116/67, respiratory 
rate 20, oxygen saturation 95% on room air.

GENERAL:  No apparent distress.

HEENT:  PERRL, EOMI.  No scleral icterus.  No conjunctival pallor.

NECK:  No JVD.

LUNGS:  Clear to auscultation.

CARDIOVASCULAR:  Regular rate and rhythm.  Normal S1, S2.

ABDOMEN:  Normoactive bowel sounds, soft, nontender, nondistended.

EXTREMITIES:  No edema.

NEUROLOGIC:  Awake, alert and oriented x 3.  No focal motor deficits.



LABORATORY DATA:  

No new labs.



ASSESSMENT:  

The patient is a 68-year-old woman with multiple medical comorbidities including
severe COPD with active tobacco dependence who was initially admitted for 
management of community-acquired pneumonia and was subsequently transferred to 
TCU for continued PT.



PLAN:

1.  Community-acquired pneumonia, resolving.  Continue Levaquin 500 mg p.o. 
daily to complete a 10 day course (today is day #7 of 10).

2.  Severe COPD.  Continue with supplemental oxygen and bronchodilators.

3.  Insulin-dependent diabetes mellitus.  Continue with current insulin regimen.
 Continue monitor fingersticks and adjust insulin as needed.

4.  CAD s/p PCI with stent placement.  Continue Aspirin 325 mg p.o. daily, 
Lipitor 40 mg p.o. daily and Toprol XL 50 mg p.o. daily.

5.  Hypertension.  Continue Toprol XL 50 mg p.o. daily and Norvasc 5 mg p.o. 
daily.

6.  Hyperlipidemia.  Continue Lipitor 40 mg p.o. daily.

7.  Anxiety disorder.  Continue Xanax 1 mg p.o. q. 6 hours as needed for 
anxiety.

8.  Sciatica.  Continue Gabapentin 300 mg p.o. t.i.d.

9.  Prophylaxis.  Continue Protonix for GI prophylaxis.  DVT prophylaxis not 
indicated as the patient is ambulatory.





CODE STATUS:  Full code.





__________________________________________

Christopher Philip MD





DD:  04/02/2019 9:43:28

DT:  04/02/2019 10:14:24

Job # [id number]



[id number]

## 2019-04-02 NOTE — HP
DATE OF EXAM:  04/01/2019



HISTORY OF PRESENT ILLNESS:  The patient is currently in PICU, room 315,

bed 1.  She has been transferred here from the acute care facility.  The

patient is a diabetic, a smoker with COPD, and hypertension.  She is here

for physical therapy.  She presents with no complaints at this time.



PAST MEDICAL HISTORY:  As previously mentioned.



ALLERGIES:  HER ALLERGIES ARE TO BACITRACIN, LASIX AND PENICILLIN.



PHYSICAL EXAMINATION

VITAL SIGNS:  This is being dictated on 04/01/2019, but it is from

03/31/2019, the patient was seen yesterday.  Blood pressure of 126/72,

temperature of 98.2, pulse rate of 83 with an O2 saturation of 93% on room

air.

HEENT:  PERRLA.  EOMI.  No icterus is present.

NECK:  Supple with a full range of motion.  No adenopathy or bruits are

appreciated.

LUNGS:  Clear to auscultation and percussion bilaterally.

HEART:  With a regular rate and rhythm.  No murmurs, rubs or gallops.

ABDOMEN:  Benign.

NEUROLOGIC:  There are no focal motor deficit.



IMPRESSION:  At this time is;

1.  Deconditioning.

2.  Diabetes.

3.  Hypertension.

4.  Chronic obstructive pulmonary disease.

5.  Tobacco use.



PLAN:  Continue with the reconditioning.







__________________________________________

Edis Philip MD





DD:  04/01/2019 20:05:12

DT:  04/01/2019 20:08:28

Job # [id number]

## 2019-04-03 NOTE — PN
SUBJECTIVE:  

The patient was seen and examined at bedside in the TCU.  No acute events 
overnight.  She remains afebrile and hemodynamically stable.



OBJECTIVE:

VITAL SIGNS:  Temperature 98, pulse 70, blood pressure 113/68, respiratory rate 
20 and oxygen saturation 100% on room air.

GENERAL:  No apparent distress.

HEENT:  PERRL, EOMI.  No scleral icterus.  No conjunctival pallor.

NECK:  No JVD.

LUNGS:  Clear to auscultation.

CARDIOVASCULAR:  Regular rate and rhythm.  Normal S1 and S2.

ABDOMEN:  Normoactive bowel sounds, soft, nontender and nondistended.

EXTREMITIES:  No edema.

NEUROLOGIC:  Awake, alert and oriented x 3.  No focal motor deficits.



LABORATORY DATA:  

No new labs.



ASSESSMENT:  

The patient is a 68-year-old woman with multiple medical comorbidities including
severe COPD with active tobacco dependence who was initially admitted for 
management of community-acquired pneumonia and was subsequently transferred to 
the TCU for continued PT.



PLAN:

1.  Community-acquired pneumonia, resolving.  Continue Levaquin 500 mg p.o. 
daily to complete a 10-day course (today is day #8 of 10).

2.  Severe COPD.  Continue with supplemental oxygen and bronchodilators.

3.  Insulin-dependent diabetes mellitus.  Continue with current insulin regimen.
 Continue monitor fingersticks and adjust insulin as needed.

4.  CAD s/p PCI with stent placement.  Continue Aspirin 325 mg p.o. daily, 
Lipitor 40 mg p.o. daily and Toprol XL 50 mg p.o. daily.

5.  Hypertension.  Continue Toprol XL 50 mg p.o. daily and Norvasc 5 mg p.o. 
daily.

6.  Hyperlipidemia.  Continue Lipitor 40 mg p.o. daily.

7.  Anxiety disorder.  Continue Xanax 1 mg p.o. q. 6 hours as needed for 
anxiety.

8.  Sciatica.  Continue Gabapentin 300 mg p.o. t.i.d.

9.  Prophylaxis.  Continue Protonix for GI prophylaxis.  DVT prophylaxis is not 
indicated as the patient is ambulatory.





CODE STATUS:  Full code.





__________________________________________

Christopher Philip MD





DD:  04/03/2019 8:46:58

DT:  04/03/2019 8:48:09

Job # [id number]



RENATA

## 2019-04-03 NOTE — PN
DATE:  04/03/2019



SUBJECTIVE:  The patient was seen today.  The patient presented well, but

anxious.  The patient reported she did not sleep for past 2 days.  This

writer offered to resume Seroquel 25 mg at the nighttime with a plan to

wean it off slowly as outpatient.  The patient agreed with that plan.  The

patient reported that overall she is improving.  She expressed interest to

see Dr. gNuyen in [facility].  Information was provided.  The

patient reported that she is feeling much better.  She is adamant to quit

smoking.  The patient denied feeling anxious.  Denied feeling of

hopelessness or helplessness.  Denied any psychosis.



OBJECTIVE:

VITAL SIGNS:  Stable.

MENTAL STATUS EXAMINATION:  The patient presented to be alert, good

personal hygiene.  Mood described as feeling better.  Affect was more

reactive and mood congruent.  Thought process coherent and goal directed. 

Thought content, the patient denied visual, auditory, or tactile

hallucinations.  Denied paranoid ideation.  The patient denied thoughts of

harming herself or others and intent or plan.  Insight and judgment seems

to be improving.  Impulse well controlled.



MEDICATIONS:  Reviewed.  This writer will put back Seroquel 25 mg at the

nighttime as needed for insomnia as well as psychosis.  Of note, the

patient was on higher dose of Seroquel for many years prescribed by Dr. Aleks Oh at the beginning and primary care was continued that medication.



LABORATORY DATA:  Reviewed.



IMPRESSION:  As per history, depression and anxiety and mood spectrum

disorder.



PLAN:  The patient was provided with information about [facility].

The patient expressed her highest interest to speak to psychiatrist as

outpatient.  Seroquel was increased back to 25 mg at the nighttime with a

plan to wean off as outpatient.  The patient contracted for safety.  The

patient denied feeling anxious.  Denied feeling of hopelessness or

helplessness.  At present moment, the patient pose no imminent danger to

self or others.  Should you have any questions, give me a call back.  This

writer will sign off.



Thank you very much.







__________________________________________

Emily Denson MD



DD:  04/03/2019 16:05:07

DT:  04/03/2019 16:26:58

Job # [id number]

## 2019-04-04 NOTE — PN
SUBJECTIVE:  

The patient was seen and examined at bedside in the TCU.  No acute events 
overnight.  She remains afebrile and hemodynamically stable.



OBJECTIVE:

VITAL SIGNS:  Temperature 97.7, pulse 76, blood pressure 100/65, respiratory 
rate 20, oxygen saturation 97% on room air.

GENERAL:  No apparent distress.

HEENT:  PERRL, EOMI.  No scleral icterus.  No conjunctival pallor.

NECK:  No JVD.

LUNGS:  Clear to auscultation.

CARDIOVASCULAR:  Regular rate and rhythm.  Normal S1, S2.

ABDOMEN:  Normoactive bowel sounds, soft, nontender, nondistended.

EXTREMITIES:  No edema.

NEUROLOGIC:  Awake, alert and oriented x 3.  No focal motor deficits.



LABORATORY DATA:  

No new labs.



ASSESSMENT:  

The patient is a 68-year-old woman with multiple medical comorbidities including
severe COPD with active tobacco dependence who was initially admitted for 
management of community-acquired pneumonia and was subsequently transferred to 
the TCU for continued PT.



PLAN:

1.  Community-acquired pneumonia, resolving.  Continue Levaquin 500 mg p.o. 
daily to complete 10 day course (today is day #9 of 10).

2.  Severe COPD.  Continue supplemental oxygen and bronchodilators.

3.  Insulin-dependent diabetes mellitus.  Continue with current insulin regimen,
monitor fingersticks and adjust insulin as needed.

4.  CAD s/p PCI with stent placement.  Continue Aspirin 325 mg p.o. daily, 
Lipitor 40 mg p.o. daily and Toprol XL 50 mg p.o. daily.

5.  Hypertension.  Continue Toprol XL 50 mg p.o. daily and Norvasc 5 mg p.o. 
daily.

6.  Hyperlipidemia.  Continue Lipitor 40 mg p.o. daily.

7.  Anxiety disorder.  Continue Xanax 1 mg p.o. q. 6 hours p.r.n. anxiety.

8.  Sciatica.  Continue Gabapentin 300 mg p.o. t.i.d. and start Flexeril 5 mg 
p.o. t.i.d.

9.  Prophylaxis.  Continue Protonix for GI prophylaxis.  DVT prophylaxis not 
indicated as the patient is ambulatory.





CODE STATUS:  Full code.







__________________________________________

Christopher Philip MD





DD:  04/04/2019 8:50:00

DT:  04/04/2019 8:51:19

Job # [id number]



RENATA

## 2019-04-04 NOTE — CP.PCM.CON
History of Present Illness





- History of Present Illness


History of Present Illness: 


Podiatry consult note fro Sandie Sarmiento/Donna





69 y/o female patient seen and evaluated with Dr. Sarmiento for elongated toenails

and cramping to bilateral lower extremity. Patient states she is diabetic and 

prefers not to trim her own toenails. Patient states the cramping is 

intermittent, worse with ambulation. Patient denies any other complaints of 

fever, nausea, vomiting, shortness of breath, or chest pain





PMHx: CAD,S/P PCI,HTN,HLD,IDDM,anxiety,sciatica








Review of Systems





- Review of Systems


All systems: reviewed and no additional remarkable complaints except


Review of Systems: 





As per HPI





Past Patient History





- Infectious Disease


Hx of Infectious Diseases: None





- Tetanus Immunizations


Tetanus Immunization: Unknown





- Past Social History


Smoking Status: Current Some Days Smoker





- CARDIAC


Hx Cardiac Disorders: Yes


Hx Hypercholesterolemia: Yes


Hx Hypertension: Yes





- PULMONARY


Hx Chronic Obstructive Pulmonary Disease (COPD): Yes





- NEUROLOGICAL


Hx Paralysis: No





- HEENT


Hx HEENT Problems: No





- RENAL


Hx Chronic Kidney Disease: No





- ENDOCRINE/METABOLIC


Hx Diabetes Mellitus Type 1: Yes (IDDM)





- HEMATOLOGICAL/ONCOLOGICAL


Hx Blood Transfusions: No





- INTEGUMENTARY


Hx Dermatological Problems: Yes


Hx Psoriasis: Yes





- MUSCULOSKELETAL/RHEUMATOLOGICAL


Hx Falls: No





- GASTROINTESTINAL


Hx Gastrointestinal Disorders: Yes


Hx Gastroesophageal Reflux: Yes





- GENITOURINARY/GYNECOLOGICAL


Hx Reproductive Disorders: No





- PSYCHIATRIC


Hx Panic Symptoms: Yes


Hx Substance Use: No





- SURGICAL HISTORY


Hx Cardiac Catheterization: Yes (2008)


Hx Coronary Stent: Yes (X2)


Other/Comment: trigger finger and ulna sx





- ANESTHESIA


Hx Anesthesia Reactions: No


Hx Malignant Hyperthermia: No





Meds


Allergies/Adverse Reactions: 


                                    Allergies











Allergy/AdvReac Type Severity Reaction Status Date / Time


 


bacitracin Allergy  SWELLING Verified 04/02/19 20:41


 


latex Allergy  RASH Verified 04/02/19 20:41


 


Penicillins Allergy  ANAPHYLAXIS Verified 04/02/19 20:41














- Medications


Medications: 


                               Current Medications





Acetaminophen (Tylenol 325mg Tab)  650 mg PO Q6H PRN; Protocol


   PRN Reason: Pain, Mild (1-3)


   Last Admin: 04/03/19 14:44 Dose:  650 mg


Al Hydrox/Mg Hydrox/Simethicone (Maalox Plus 30 Ml)  30 ml PO QID PRN; Protocol


   PRN Reason: Indigestion / Heartburn


   Last Admin: 03/31/19 05:52 Dose:  30 ml


Albuterol/Ipratropium (Duoneb 3 Mg/0.5 Mg (3 Ml) Ud)  3 ml IH [id number] [facility]; 

Protocol


   Last Admin: 04/04/19 11:11 Dose:  3 ml


Albuterol/Ipratropium (Duoneb 3 Mg/0.5 Mg (3 Ml) Ud)  3 ml IH Q2H PRN; Protocol


   PRN Reason: Shortness of Breath


Alprazolam (Xanax)  1 mg PO QID PRN; Protocol


   PRN Reason: Anxiety


   Last Admin: 04/04/19 13:30 Dose:  1 mg


Amlodipine Besylate (Norvasc)  5 mg PO DAILY [facility]; Protocol


   Last Admin: 04/04/19 09:12 Dose:  Not Given


Arformoterol Tartrate (Brovana)  15 mcg IH [id number] [facility]; Protocol


   Last Admin: 04/04/19 07:17 Dose:  15 mcg


Aspirin (Ecotrin)  325 mg PO 0800 [facility]; Protocol


   Last Admin: 04/04/19 07:51 Dose:  325 mg


Atorvastatin Calcium (Lipitor)  40 mg PO DIN [facility]; Protocol


   Last Admin: 04/03/19 17:29 Dose:  40 mg


Budesonide (Pulmicort Respules)  0.5 mg IH [id number] [facility]; Protocol


   Last Admin: 04/04/19 07:18 Dose:  0.5 mg


Cyclobenzaprine HCl (Flexeril)  5 mg PO TID [facility]


   Last Admin: 04/04/19 13:28 Dose:  5 mg


Furosemide (Lasix)  20 mg PO DAILY [facility]; Protocol


   Last Admin: 04/04/19 09:10 Dose:  20 mg


Gabapentin (Neurontin)  300 mg PO TID [facility]; Protocol


   Last Admin: 04/04/19 13:29 Dose:  300 mg


Guaifenesin/Codeine Phosphate (Robitussin W/Codeine)  5 ml PO Q4H PRN; Protocol


   PRN Reason: Cough and congestion


   Last Admin: 04/03/19 08:15 Dose:  5 ml


Insulin Detemir (Levemir)  15 unit SC ACB [facility]; Protocol


   Last Admin: 04/04/19 06:38 Dose:  Not Given


Insulin Detemir (Levemir)  20 unit SC HS [facility]; Protocol


   Last Admin: 04/03/19 21:15 Dose:  20 units


Insulin Human Lispro (Humalog High)  0 units SC ACHS [facility]; Protocol


   Last Admin: 04/04/19 13:28 Dose:  3 units


Levofloxacin (Levaquin)  500 mg PO DAILY [facility]; Protocol


   Last Admin: 04/04/19 09:11 Dose:  500 mg


Metoprolol Succinate (Toprol Xl)  50 mg PO BRK [facility]; Protocol


   Last Admin: 04/04/19 08:00 Dose:  Not Given


Nicotine (Nicoderm Cq)  1 patch TD DAILY [facility]; Protocol


   Last Admin: 04/04/19 09:11 Dose:  1 patch


Ondansetron HCl (Zofran Tab)  4 mg PO Q8H PRN; Protocol


   PRN Reason: Nausea/Vomiting


   Last Admin: 03/30/19 18:06 Dose:  4 mg


Pantoprazole Sodium (Protonix Ec Tab)  40 mg PO 0600 [facility]; Protocol


   Last Admin: 04/04/19 06:23 Dose:  Not Given


Quetiapine Fumarate (Seroquel)  25 mg PO HS PRN; Protocol


   PRN Reason: insomnia/psychosis


   Last Admin: 04/03/19 21:14 Dose:  25 mg


Throat Lozenges (Cepastat)  1 jason MT Q6H PRN; Protocol


   PRN Reason: Sore Throat











Physical Exam





- Constitutional


Appears: Well, Non-toxic, No Acute Distress





- Head Exam


Head Exam: ATRAUMATIC, NORMOCEPHALIC





- Extremities Exam


Additional comments: 


Bilateral LE focused exam





VASC: DP and PT weakly palpable on left, non-palpable on the right, cap refill 

<3 seconds to all digits; temp gradient warm to cool from proximal to distal, no

edema noted





NEURO: Protective sensation slightly diminished





DERM: toenails elongated X 10, no open lesions, no IDM, no clinical signs of 

infection





MSKL: no pain with any ranges of motion, Muscle power 5/5 of the left foot to 

all groups. 








- Neurological Exam


Neurological exam: Alert, Oriented x3





- Psychiatric Exam


Psychiatric exam: Normal Affect, Normal Mood





Results





- Vital Signs


Recent Vital Signs: 


                                Last Vital Signs











Temp  97.7 F   04/04/19 08:00


 


Pulse  76   04/04/19 08:00


 


Resp  20   04/04/19 08:00


 


BP  101/64   04/04/19 09:12


 


Pulse Ox  97   04/04/19 08:00














- Labs


Labs: 


                         Laboratory Results - last 24 hr











  04/03/19 04/03/19 04/04/19





  16:21 21:11 05:23


 


POC Glucose (mg/dL)  251 H  220 H  94














Assessment & Plan





- Assessment and Plan (Free Text)


Assessment: 


69 y/o female patient seen and evaluated for elongated toenails and cramping to 

b/L LE





Plan: 


Patient seen and evaluated with Dr. Sarmiento


Plan discussed with Dr. Sarmiento


Chart, labs and vitals were reviewed


Aseptic debridement of toenails X 10, patient tolerated well


Ordered DIEUDONNE/PVR to assess blow flow bilaterally due to weak pulses


Podiatry will follow up


Thank you for the consult

## 2019-04-05 NOTE — DS
HISTORY OF PRESENT ILLNESS:  The patient is in room 315, bed 1.  The

patient was admitted for rehab after a stay in the acute care facility for

urinary tract infection and cellulitis.



PAST MEDICAL HISTORY:  Diabetes, hypertension, COPD and cellulitis.



FAMILY HISTORY:  Unremarkable.



ALLERGIES:  BACITRACIN, LATEX AND PENICILLINS.



PHYSICAL EXAMINATION

VITAL SIGNS:  Temperature of 98.2, pulse rate of 102, blood pressure

138/94, O2 saturation 99% on room air.

HEENT:  PERRLA, EOMI.  No icterus.

NECK:  Supple.  There is a full range of motion.  No bruits are

appreciated.

LUNGS:  Clear to auscultation and percussion bilaterally with diminished

breath sounds throughout.

HEART:  Regular rate and rhythm.  No murmurs.

ABDOMEN:  Soft and nontender.  Bowel sounds are normoactive.

EXTREMITIES:  Show no deformities or edema.

NEUROLOGIC:  There are no focal motor deficits.



IMPRESSION

1.  Deconditioning.

2.  Diabetes.

3.  Hypertension.

4.  Status post cellulitis.



The patient will be discharged on all previous medications and followed up

in the office next week.







__________________________________________

Edis Philip MD





DD:  04/05/2019 9:16:26

DT:  04/05/2019 9:21:40

Job # [id number]

## 2020-08-17 NOTE — CON
DATE: 04/24/2017



HISTORY OF PRESENT ILLNESS:  This is a 66-year-old female with past medical history of COPD, coronary
 artery disease, who came to hospital feeling shaky and associated with neck discomfort.  The patient
's symptoms began with a cold, cough, congestion for 2 weeks.  PMD gave the antibiotic Levaquin.  The
 patient has pain and foot swelling and reports of dragging the left foot.



PAST MEDICAL HISTORY:  COPD, coronary artery disease.



HOME MEDICATIONS:  Lipitor, Seroquel, Xanax, Ecotrin, Toprol, Diovan.



REVIEW OF SYSTEMS:  A 10-point review of system was negative.



PHYSICAL EXAMINATION:

VITAL SIGNS:  Blood pressure 155/74.

HEENT:  Normocephalic, atraumatic.

NECK:  Supple.

NEUROLOGIC:  Awake and alert, oriented.  No aphasia.  Cranial nerves II through XII were tested.  Pup
ils reactive.  Spontaneous movement of the extremities noted.  Deep tendon reflexes 1+.  Both plantar
s are downgoing.  Sensory appears intact.  Cerebellar gait deferred.



IMPRESSION:  A 66-year-old female with a prior cardiac history of recent stents and came with syncope
.  CAT scan of the head was unremarkable.



PLAN:  Continue present management.  Will follow up.





__________________________________________

Israel Cortez MD







cc:



DD: 04/24/2017 18:38:47  [id number]

TT: 04/24/2017 19:41:20

Confirmation # [id number]

Dictation # [id number]

mn Remind patient that he should now only take 5 tablets a day.

## 2023-05-07 NOTE — PN
SUBJECTIVE:  

The patient was seen and examined at bedside on the telemetry gee.  No acute 
events overnight.  She remains afebrile and hemodynamically stable.  This 
morning she reports some improvement in her respiratory status and feels 
slightly improved since admission and otherwise offers no complaints.



OBJECTIVE:

VITAL SIGNS:  Temperature 98, pulse 89, blood pressure 136/62, respiratory rate 
20, oxygen saturation 94% on 2L NC.

GENERAL:  No apparent distress.

HEENT:  PERRL, EOMI.  No scleral icterus.  No conjunctival pallor.

NECK:  No JVD.

LUNGS:  Decreased breath sounds at the bases with faint expiratory wheeze.

CARDIOVASCULAR:  Regular rate and rhythm.  Normal S1, S2.

ABDOMEN:  Normoactive bowel sounds, soft, nontender, nondistended.

EXTREMITIES:  Trace pedal edema.

NEUROLOGIC:  Awake, alert and oriented x 3.  No focal motor deficits.



LABORATORY DATA:  

WBC 10.4 with 87% neutrophils, hemoglobin 10.5, hematocrit 32, platelets 346. 

Sodium 134, potassium 4, chloride 97, bicarb 29, BUN 17, creatinine 0.6, glucose
357.



ASSESSMENT:  

The patient is a 68-year-old woman with multiple medical comorbidities including
severe COPD with active tobacco dependence who presented for evaluation of a 5 
day history of chest tightness, wheeze, dyspnea and cough productive of green 
sputum and was admitted for management of community-acquired pneumonia.



PLAN:

1.  Community-acquired pneumonia, improving.  Continue supplemental oxygen, 
bronchodilators and steroid taper.  Continue Levaquin 500 mg IV daily.  Continue
to monitor for fever and leukocytosis and await culture reports.

2.  Severe COPD.  Continue with supplemental oxygen, bronchodilators and steroid
taper.

3.  Insulin-dependent diabetes mellitus.  Continue with current regimen.  
Fingersticks remain elevated secondary to IV steroids and we will adjust insulin
as needed.

4.  CAD s/p PCI with stent placement.  Continue Aspirin 325 mg p.o. daily, 
Lipitor 40 mg p.o. daily and Toprol XL 50 mg p.o. daily.

5.  Hypertension.  Continue Toprol XL 50 mg p.o. daily and Norvasc 5 mg p.o. 
daily.

6.  Hyperlipidemia.  Continue Lipitor 40 mg p.o. daily.

7.  Anxiety disorder.  Continue Xanax 1 mg p.o. q. 6 hours p.r.n. anxiety.

8.  Sciatica.  Continue Gabapentin 300 mg p.o. t.i.d.

9.  Prophylaxis.  Continue Protonix for GI prophylaxis.  DVT prophylaxis not 
indicated as the patient is ambulatory.





CODE STATUS:  Full code.





__________________________________________

Christopher Philip MD





DD:  03/27/2019 8:53:36

DT:  03/27/2019 8:55:00

Job # [id number]



RENATA Continue home regimen Amlodipine, Enalapril and BB